# Patient Record
Sex: FEMALE | Race: ASIAN | Employment: FULL TIME | URBAN - METROPOLITAN AREA
[De-identification: names, ages, dates, MRNs, and addresses within clinical notes are randomized per-mention and may not be internally consistent; named-entity substitution may affect disease eponyms.]

---

## 2017-01-13 ENCOUNTER — MYC MEDICAL ADVICE (OUTPATIENT)
Dept: GASTROENTEROLOGY | Facility: CLINIC | Age: 42
End: 2017-01-13

## 2017-01-13 DIAGNOSIS — R10.31 ABDOMINAL PAIN, RIGHT LOWER QUADRANT: Primary | ICD-10-CM

## 2017-01-13 RX ORDER — AMITRIPTYLINE HYDROCHLORIDE 10 MG/1
20 TABLET ORAL AT BEDTIME
Qty: 60 TABLET | Refills: 2 | Status: SHIPPED | OUTPATIENT
Start: 2017-01-13 | End: 2017-02-15

## 2017-02-14 ASSESSMENT — ENCOUNTER SYMPTOMS
VOMITING: 0
HEARTBURN: 0
BLOOD IN STOOL: 0
JAUNDICE: 0
NAUSEA: 0
BLOATING: 1
DIARRHEA: 0
RECTAL PAIN: 0
BOWEL INCONTINENCE: 0
CONSTIPATION: 1
ABDOMINAL PAIN: 1
RECTAL BLEEDING: 0

## 2017-02-15 ENCOUNTER — OFFICE VISIT (OUTPATIENT)
Dept: GASTROENTEROLOGY | Facility: CLINIC | Age: 42
End: 2017-02-15

## 2017-02-15 VITALS
SYSTOLIC BLOOD PRESSURE: 117 MMHG | BODY MASS INDEX: 22.52 KG/M2 | TEMPERATURE: 98.9 F | HEIGHT: 61 IN | OXYGEN SATURATION: 98 % | DIASTOLIC BLOOD PRESSURE: 69 MMHG | HEART RATE: 74 BPM | WEIGHT: 119.3 LBS

## 2017-02-15 DIAGNOSIS — K58.9 IRRITABLE BOWEL SYNDROME WITHOUT DIARRHEA: Primary | ICD-10-CM

## 2017-02-15 DIAGNOSIS — Z91.011 MILK ALLERGY: ICD-10-CM

## 2017-02-15 DIAGNOSIS — R10.31 ABDOMINAL PAIN, RIGHT LOWER QUADRANT: ICD-10-CM

## 2017-02-15 RX ORDER — AMITRIPTYLINE HYDROCHLORIDE 10 MG/1
30 TABLET ORAL AT BEDTIME
Qty: 90 TABLET | Refills: 2 | Status: SHIPPED | OUTPATIENT
Start: 2017-02-15 | End: 2017-06-08

## 2017-02-15 RX ORDER — HYOSCYAMINE SULFATE 0.125 MG
0.125-0.25 TABLET ORAL EVERY 4 HOURS PRN
Qty: 60 TABLET | Refills: 1 | Status: SHIPPED | OUTPATIENT
Start: 2017-02-15 | End: 2017-11-30

## 2017-02-15 ASSESSMENT — PAIN SCALES - GENERAL: PAINLEVEL: NO PAIN (0)

## 2017-02-15 NOTE — MR AVS SNAPSHOT
After Visit Summary   2/15/2017    Bee Norris    MRN: [id number]           Patient Information     Date Of Birth          1975        Visit Information        Provider Department      2/15/2017 1:30 PM Ora Grove, APRN CNP [facility]        Today's Diagnoses     Irritable bowel syndrome without diarrhea    -  1    Milk allergy        Abdominal pain, right lower quadrant          Care Instructions    Soluble fiber sops up water and gives soft stool and formed stool.    This also supports healthy gut bacteria.  Psyllium (Metamucil) is most natural.   Powder - mix and drink immediately, -  or use the tablets.  or  Wheat dextrin (Benefiber) which has no taste or texture.   Available as powder, capsule, chewable.  When in doubt, return to powder.  Take the dose on the label.   If you have no improvement at two weeks, increase the dose and be sure to use it twice daily.  You may feel bloat at the beginning.   Improvement comes gradually.  Continue this regularly for a couple months before deciding whether it is helpful for you.  * Or synthetic Citrucel. Some think they have less bloat with this.      The Low FODMAP diet.  Some persons have difficulty digesting one or more of the carbohydrate groups.  This causes bloat. It affects more people than gluten intolerance.  The Low FODMAP diet is best explored with a dietitian for maintaining nutrition needs. The FODMAPs are fructo-oligo-di-monosaccharides or polyol carbohydrates.    For examples  https://[email or web address].com/[email or web address]/fodmap-intolerances.jpg  This is what we printed out.    Bloat that goes away in hours or overnight is usually fluid shifts with digestion, gas, abdominal cramps tensing the abdomen, or excess stool.  If it does not go away overnight, it is usually fat.  However, if bloat does not go away overnight and you are not SURE it is fat, go to your primary provider or GYN provider for a  check.    Bloat is sometimes a reaction to fiber. It is increased also with soda, sugar, ... You had no benefit from soda free...    You have not found a specific food intolerance.  Restart dairy with lactose free.  Later, ok to try dairy with lactose.   It is more likely that you have lactose intolerance than milk protein issues.     Lactose intolerance is the inability to digest the milk sugar.  Is it a common cause of gas and of loose stool or both.  This may come on slowly any time in life.  It happens more as we grow older.    Some people tolerate small amounts of lactose as in hard cheese or very small servings.  Alternatives are   Lactose free milk and milk products  Lactaid Milk   [Lactaid drop or pills]  Fountain milk  Soy milk and yogurt.      Good that you are able to travel without constipation .  If you need help,  Magnesium oxide tablets 400 mg 2, 4, 6, (*)  Or Milk of magnesia    Not mag cit, which is harsh.    Amitriptyline increase to 30 mg q bedtime. If no better in a few weeks, do you wish to taper off?  30 to 20 and stay there a couple weeks. 20 to 10 and stay there a couple weeks.  Then 10 mg every other day bedtime for another week or two.  If not ok, restart or go back to the dose before.    You have no strain \so no further testing is needed about that.    Your bloat still goes away again, so no gym worries about the bloat.    Retry hyoscyamine as you don't recall the long acting form from Feb 2016 Dr. Quintanilla. It was dc'd without constipation comment.    [facility]   For appointments call Victoria, [phone]  Coordinator  [phone]   Janice  GI Nurse Triage  [phone] for Medical Questions, # 3  Fax results to                         Follow-ups after your visit        Who to contact     Please call your clinic at [phone] to:    Ask questions about your health    Make or cancel appointments    Discuss your medicines    Learn about your test  "results    Speak to your doctor   If you have compliments or concerns about an experience at your clinic, or if you wish to file a complaint, please contact [facility] Patient Relations at [phone] or email us at [email or web address]         Additional Information About Your Visit        [employer] Information     [employer] gives you secure access to your electronic health record. If you see a primary care provider, you can also send messages to your care team and make appointments. If you have questions, please call your primary care clinic.  If you do not have a primary care provider, please call [phone] and they will assist you.      [employer] is an electronic gateway that provides easy, online access to your medical records. With [employer], you can request a clinic appointment, read your test results, renew a prescription or communicate with your care team.     To access your existing account, please contact your [facility] or call [phone] for assistance.        Care EveryWhere ID     This is your Care EveryWhere ID. This could be used by other organizations to access your [facility] medical records  [id number]        Your Vitals Were     Pulse Temperature Height Pulse Oximetry BMI (Body Mass Index)       74 98.9  F (37.2  C) 5' 1\" 98% 22.54 kg/m2        Blood Pressure from Last 3 Encounters:   02/15/17 117/69   09/19/16 114/75   09/15/16 103/61    Weight from Last 3 Encounters:   02/15/17 119 lb 4.8 oz   09/19/16 112 lb   09/15/16 112 lb 14.4 oz              Today, you had the following     No orders found for display         Today's Medication Changes          These changes are accurate as of: 2/15/17  2:28 PM.  If you have any questions, ask your nurse or doctor.               Start taking these medicines.        Dose/Directions    hyoscyamine 0.125 MG tablet   Commonly known as:  ANASPAZ/LEVSIN   Used for:  Irritable bowel syndrome " without diarrhea   Started by:  Ora Grove APRN CNP        Dose:  0.125-0.25 mg   Take 1-2 tablets (125-250 mcg) by mouth every 4 hours as needed for cramping   Quantity:  60 tablet   Refills:  1         These medicines have changed or have updated prescriptions.        Dose/Directions    amitriptyline 10 MG tablet   Commonly known as:  ELAVIL   This may have changed:  how much to take   Used for:  Abdominal pain, right lower quadrant   Changed by:  Ora Grove APRN CNP        Dose:  30 mg   Take 3 tablets (30 mg) by mouth At Bedtime   Quantity:  90 tablet   Refills:  2            Where to get your medicines      These medications were sent to [id number] - [place], [place] - [address] AT [facility] [address]  [address], [place] [address]     Phone:  [phone]     amitriptyline 10 MG tablet    hyoscyamine 0.125 MG tablet                Primary Care Provider Office Phone # Fax #    Shayy Linares [phone]       [facility] [address]  [place] [address]        Thank you!     Thank you for choosing [facility]  for your care. Our goal is always to provide you with excellent care. Hearing back from our patients is one way we can continue to improve our services. Please take a few minutes to complete the written survey that you may receive in the mail after your visit with us. Thank you!             Your Updated Medication List - Protect others around you: Learn how to safely use, store and throw away your medicines at www.disposemymeds.org.          This list is accurate as of: 2/15/17  2:28 PM.  Always use your most recent med list.                   Brand Name Dispense Instructions for use    amitriptyline 10 MG tablet    ELAVIL    90 tablet    Take 3 tablets (30 mg) by mouth At Bedtime       DAILY MULTIVITAMIN PO      Take  by mouth daily.       hyoscyamine 0.125 MG tablet    ANASPAZ/LEVSIN    60 tablet     Take 1-2 tablets (125-250 mcg) by mouth every 4 hours as needed for cramping       IBUPROFEN PO      Take 200 mg by mouth       MIRALAX PO          norgestimate-ethinyl estradiol 0.25-35 MG-MCG per tablet    ORTHO-CYCLEN, SPRINTEC    84 tablet    Take 1 tablet by mouth daily       TYLENOL PO      Take 320 mg by mouth       UNABLE TO FIND      MEDICATION NAME: Shannan

## 2017-02-15 NOTE — LETTER
2/15/2017       RE: Bee Norris  [address]  [place] [address]     Dear Colleague,    Thank you for referring your patient, Bee Norris, to the [facility] at [facility]. Please see a copy of my visit note below.    CHIEF COMPLAINT:  Irritable bowel syndrome, right lower quadrant pain.      HISTORY OF PRESENT ILLNESS:  Bee is a 41-year-old woman last seen 09/05/2016 at her third visit regarding the above complaints.  She had been very well studied with numerous tests and had no sign or suggestion of disease.  She had used peppermint, which caused acid reflux, and anna, which caused everything to taste like anna and did not give benefit.  She was started on amitriptyline 10 mg at a time as a trial.  Studies had included a colonoscopy, which was done with sedation, went okay, was normal.  She also describes low back pain and had a steroid injection with no benefit and after the last visit, went to a chiropractor several times over 3 weeks.      Bee corrects that her abdominal distress is lifelong and not simply since 02/2016 as one earlier note stated.  She had no change, good or bad, with her dairy-free trial.  She had no benefit for her back pain or for her right lower quadrant pain or abdominal symptoms with the several chiropractic treatments.  She maintains good exercise and senses little or no benefit with that.      Bee continues to have bloat and gas, which are often together though not always.  There is no time frame that is consistent for when either of these occur.  She has a bowel movement usually once daily, perhaps twice daily twice a week.  She had no benefit from the dicyclomine and had previously had hyoscyamine.  She continues to have a sense of incomplete evacuation and occasionally needs to use multiple wipes.  She is taking Psyllium 2 capsules at noon, 4 and 10 p.m. for a daily total of 6.  She is  no longer using any MiraLax.  She has watched and has not found any association with her dietary intake.  She believes her pain is less than before, though it continues.  She no longer ever has strain for her bowel movements.  She thinks the amitriptyline is of some benefit for this decrease in pain and decrease in thinking of the pain.      MEDICAL HISTORY, MEDICATIONS, ADVERSE DRUG REACTIONS:  Reviewed.  There are no new diagnoses.      SOCIAL HISTORY:  She is single and lives alone.  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  She uses no tobacco, alcohol or street drugs.      REVIEW OF SYSTEMS:  Overall well.  Seldom uses ibuprofen.  Denies symptoms in any other systems.      OBJECTIVE:   VITAL SIGNS:  Reviewed, normal and stable.  BMI 22.54.   GENERAL:  Clean, neatly groomed, slender but fit-appearing woman who shows no distress.  Breathing is calm.  Speech is fluent and logical.  Not otherwise examined today.      ASSESSMENT:  A 41-year-old woman with bloat and abdominal discomfort and suboptimal stool who feels slightly better with low dose amitriptyline and who occasionally has cramping abdomen after meals.      PLAN:   1.  Continue amitriptyline at this time.   2.  Again trial of hyoscyamine.   3.  Consider increasing her soluble fiber or try a different kind of soluble fiber.   4.  Look further at the low FODMAP diet.  Let us know if she wishes to meet with the dietitian about that.  She has looked at it briefly on her own and we have given her a different chart today.   5.  She is cautioned to be sure that bloat always goes away and otherwise to be seen by her primary provider or GYN.   6.  Note whether she has bloat increased after fiber.  She had no benefit from soda free but also drinks usually only 1 soda some days.  She remains lactose-free and should now restart dairy with lactose free trial and later try with lactose again.  It is not clear that she actually  has any milk protein reaction, though she had a very mild elevation on her blood allergy test for dairy.      Bee does report being able to travel without constipation if she is traveling alone for work, though with other persons, she still has difficulty.  She no longer has to strain for stool so pelvic floor studies are no longer being entertained.      We discussed the assessment and plan.  There were no further questions.  Bee will keep in touch and return in 1 year if she does not need a sooner return.      CORA MACIEL CNP       D: 2017 16:06   T: 2017 11:27   MT: BRADLY      Name:     BEE LOWRY   MRN:      [id number]        Account:      [id number]   :      1975           Service Date: 02/15/2017      Document: [id number]

## 2017-02-15 NOTE — PATIENT INSTRUCTIONS
Soluble fiber sops up water and gives soft stool and formed stool.    This also supports healthy gut bacteria.  Psyllium (Metamucil) is most natural.   Powder - mix and drink immediately, -  or use the tablets.  or  Wheat dextrin (Benefiber) which has no taste or texture.   Available as powder, capsule, chewable.  When in doubt, return to powder.  Take the dose on the label.   If you have no improvement at two weeks, increase the dose and be sure to use it twice daily.  You may feel bloat at the beginning.   Improvement comes gradually.  Continue this regularly for a couple months before deciding whether it is helpful for you.  * Or synthetic Citrucel. Some think they have less bloat with this.      The Low FODMAP diet.  Some persons have difficulty digesting one or more of the carbohydrate groups.  This causes bloat. It affects more people than gluten intolerance.  The Low FODMAP diet is best explored with a dietitian for maintaining nutrition needs. The FODMAPs are fructo-oligo-di-monosaccharides or polyol carbohydrates.    For examples  https://ibsfreedom.files.[employer]/[email or web address]/03/fodmap-intolerances.jpg  This is what we printed out.    Bloat that goes away in hours or overnight is usually fluid shifts with digestion, gas, abdominal cramps tensing the abdomen, or excess stool.  If it does not go away overnight, it is usually fat.  However, if bloat does not go away overnight and you are not SURE it is fat, go to your primary provider or GYN provider for a check.    Bloat is sometimes a reaction to fiber. It is increased also with soda, sugar, ... You had no benefit from soda free...    You have not found a specific food intolerance.  Restart dairy with lactose free.  Later, ok to try dairy with lactose.   It is more likely that you have lactose intolerance than milk protein issues.     Lactose intolerance is the inability to digest the milk sugar.  Is it a common cause of gas and of loose stool or both.  This  may come on slowly any time in life.  It happens more as we grow older.    Some people tolerate small amounts of lactose as in hard cheese or very small servings.  Alternatives are   Lactose free milk and milk products  Lactaid Milk   [Lactaid drop or pills]  [place] milk  Soy milk and yogurt.      Good that you are able to travel without constipation .  If you need help,  Magnesium oxide tablets 400 mg 2, 4, 6, (*)  Or Milk of magnesia    Not mag cit, which is harsh.    Amitriptyline increase to 30 mg q bedtime. If no better in a few weeks, do you wish to taper off?  30 to 20 and stay there a couple weeks. 20 to 10 and stay there a couple weeks.  Then 10 mg every other day bedtime for another week or two.  If not ok, restart or go back to the dose before.    You have no strain \so no further testing is needed about that.    Your bloat still goes away again, so no gym worries about the bloat.    Retry hyoscyamine as you don't recall the long acting form from Feb 2016 Dr. Quintanilla. It was dc'd without constipation comment.    [facility]   For appointments call Victoria, [phone]  Coordinator  [phone]   Janice  GI Nurse Triage  [phone] for Medical Questions, # 3  Fax results to

## 2017-02-15 NOTE — NURSING NOTE
"Chief Complaint   Patient presents with     RECHECK     f/u       Vitals:    02/15/17 1336   BP: 117/69   BP Location: Left arm   Patient Position: Chair   Pulse: 74   Temp: 98.9  F (37.2  C)   SpO2: 98%   Weight: 119 lb 4.8 oz   Height: 5' 1\"       Body mass index is 22.54 kg/(m^2).    Marta Carbajal MA                            "

## 2017-02-17 NOTE — PROGRESS NOTES
CHIEF COMPLAINT:  Irritable bowel syndrome, right lower quadrant pain.      HISTORY OF PRESENT ILLNESS:  Bee is a 41-year-old woman last seen 09/05/2016 at her third visit regarding the above complaints.  She had been very well studied with numerous tests and had no sign or suggestion of disease.  She had used peppermint, which caused acid reflux, and anna, which caused everything to taste like anna and did not give benefit.  She was started on amitriptyline 10 mg at a time as a trial.  Studies had included a colonoscopy, which was done with sedation, went okay, was normal.  She also describes low back pain and had a steroid injection with no benefit and after the last visit, went to a chiropractor several times over 3 weeks.      Bee corrects that her abdominal distress is lifelong and not simply since 02/2016 as one earlier note stated.  She had no change, good or bad, with her dairy-free trial.  She had no benefit for her back pain or for her right lower quadrant pain or abdominal symptoms with the several chiropractic treatments.  She maintains good exercise and senses little or no benefit with that.      Bee continues to have bloat and gas, which are often together though not always.  There is no time frame that is consistent for when either of these occur.  She has a bowel movement usually once daily, perhaps twice daily twice a week.  She had no benefit from the dicyclomine and had previously had hyoscyamine.  She continues to have a sense of incomplete evacuation and occasionally needs to use multiple wipes.  She is taking Psyllium 2 capsules at noon, 4 and 10 p.m. for a daily total of 6.  She is no longer using any MiraLax.  She has watched and has not found any association with her dietary intake.  She believes her pain is less than before, though it continues.  She no longer ever has strain for her bowel movements.  She thinks the amitriptyline is of some benefit for this decrease in  pain and decrease in thinking of the pain.      MEDICAL HISTORY, MEDICATIONS, ADVERSE DRUG REACTIONS:  Reviewed.  There are no new diagnoses.      SOCIAL HISTORY:  She is single and lives alone.  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  She uses no tobacco, alcohol or street drugs.      REVIEW OF SYSTEMS:  Overall well.  Seldom uses ibuprofen.  Denies symptoms in any other systems.      OBJECTIVE:   VITAL SIGNS:  Reviewed, normal and stable.  BMI 22.54.   GENERAL:  Clean, neatly groomed, slender but fit-appearing woman who shows no distress.  Breathing is calm.  Speech is fluent and logical.  Not otherwise examined today.      ASSESSMENT:  A 41-year-old woman with bloat and abdominal discomfort and suboptimal stool who feels slightly better with low dose amitriptyline and who occasionally has cramping abdomen after meals.      PLAN:   1.  Continue amitriptyline at this time.   2.  Again trial of hyoscyamine.   3.  Consider increasing her soluble fiber or try a different kind of soluble fiber.   4.  Look further at the low FODMAP diet.  Let us know if she wishes to meet with the dietitian about that.  She has looked at it briefly on her own and we have given her a different chart today.   5.  She is cautioned to be sure that bloat always goes away and otherwise to be seen by her primary provider or GYN.   6.  Note whether she has bloat increased after fiber.  She had no benefit from soda free but also drinks usually only 1 soda some days.  She remains lactose-free and should now restart dairy with lactose free trial and later try with lactose again.  It is not clear that she actually has any milk protein reaction, though she had a very mild elevation on her blood allergy test for dairy.      Bee does report being able to travel without constipation if she is traveling alone for work, though with other persons, she still has difficulty.  She no longer has to strain for  stool so pelvic floor studies are no longer being entertained.      We discussed the assessment and plan.  There were no further questions.  Bee will keep in touch and return in 1 year if she does not need a sooner return.         CORA MACIEL CNP             D: 2017 16:06   T: 2017 11:27   MT: BRADLY      Name:     BEE LOWRY   MRN:      [id number]        Account:      [id number]   :      1975           Service Date: 02/15/2017      Document: [id number]

## 2017-05-22 ENCOUNTER — OFFICE VISIT (OUTPATIENT)
Dept: FAMILY MEDICINE | Facility: CLINIC | Age: 42
End: 2017-05-22
Payer: COMMERCIAL

## 2017-05-22 VITALS
BODY MASS INDEX: 23.79 KG/M2 | OXYGEN SATURATION: 98 % | HEIGHT: 61 IN | DIASTOLIC BLOOD PRESSURE: 67 MMHG | RESPIRATION RATE: 16 BRPM | HEART RATE: 73 BPM | SYSTOLIC BLOOD PRESSURE: 112 MMHG | WEIGHT: 126 LBS | TEMPERATURE: 98.5 F

## 2017-05-22 DIAGNOSIS — D12.6 BENIGN NEOPLASM OF COLON, UNSPECIFIED PART OF COLON: ICD-10-CM

## 2017-05-22 DIAGNOSIS — Z11.3 SCREEN FOR STD (SEXUALLY TRANSMITTED DISEASE): ICD-10-CM

## 2017-05-22 DIAGNOSIS — R63.5 ABNORMAL WEIGHT GAIN: ICD-10-CM

## 2017-05-22 DIAGNOSIS — N83.201 CYST OF RIGHT OVARY: ICD-10-CM

## 2017-05-22 DIAGNOSIS — Z00.00 ROUTINE GENERAL MEDICAL EXAMINATION AT A HEALTH CARE FACILITY: Primary | ICD-10-CM

## 2017-05-22 DIAGNOSIS — M79.89 SWELLING OF LIMB: ICD-10-CM

## 2017-05-22 LAB
ALBUMIN UR-MCNC: NEGATIVE MG/DL
APPEARANCE UR: CLEAR
BACTERIA #/AREA URNS HPF: ABNORMAL /HPF
BETA HCG QUAL IFA URINE: NEGATIVE
BILIRUB UR QL STRIP: NEGATIVE
COLOR UR AUTO: YELLOW
GLUCOSE UR STRIP-MCNC: NEGATIVE MG/DL
HGB UR QL STRIP: ABNORMAL
KETONES UR STRIP-MCNC: NEGATIVE MG/DL
LEUKOCYTE ESTERASE UR QL STRIP: NEGATIVE
NITRATE UR QL: NEGATIVE
NON-SQ EPI CELLS #/AREA URNS LPF: ABNORMAL /LPF
PH UR STRIP: 6.5 PH (ref 5–7)
RBC #/AREA URNS AUTO: ABNORMAL /HPF (ref 0–2)
SP GR UR STRIP: 1.01 (ref 1–1.03)
URN SPEC COLLECT METH UR: ABNORMAL
UROBILINOGEN UR STRIP-ACNC: 0.2 EU/DL (ref 0.2–1)
WBC #/AREA URNS AUTO: ABNORMAL /HPF (ref 0–2)

## 2017-05-22 PROCEDURE — 87624 HPV HI-RISK TYP POOLED RSLT: CPT | Performed by: FAMILY MEDICINE

## 2017-05-22 PROCEDURE — 84703 CHORIONIC GONADOTROPIN ASSAY: CPT | Performed by: FAMILY MEDICINE

## 2017-05-22 PROCEDURE — 84443 ASSAY THYROID STIM HORMONE: CPT | Performed by: FAMILY MEDICINE

## 2017-05-22 PROCEDURE — 81001 URINALYSIS AUTO W/SCOPE: CPT | Performed by: FAMILY MEDICINE

## 2017-05-22 PROCEDURE — 87491 CHLMYD TRACH DNA AMP PROBE: CPT | Performed by: FAMILY MEDICINE

## 2017-05-22 PROCEDURE — 36415 COLL VENOUS BLD VENIPUNCTURE: CPT | Performed by: FAMILY MEDICINE

## 2017-05-22 PROCEDURE — 87591 N.GONORRHOEAE DNA AMP PROB: CPT | Performed by: FAMILY MEDICINE

## 2017-05-22 PROCEDURE — 84439 ASSAY OF FREE THYROXINE: CPT | Performed by: FAMILY MEDICINE

## 2017-05-22 PROCEDURE — G0145 SCR C/V CYTO,THINLAYER,RESCR: HCPCS | Performed by: FAMILY MEDICINE

## 2017-05-22 PROCEDURE — 99396 PREV VISIT EST AGE 40-64: CPT | Performed by: FAMILY MEDICINE

## 2017-05-22 RX ORDER — NORGESTIMATE AND ETHINYL ESTRADIOL 0.25-0.035
1 KIT ORAL DAILY
Qty: 84 TABLET | Refills: 3 | Status: SHIPPED | OUTPATIENT
Start: 2017-05-22 | End: 2018-05-10

## 2017-05-22 ASSESSMENT — ANXIETY QUESTIONNAIRES
GAD7 TOTAL SCORE: 1
3. WORRYING TOO MUCH ABOUT DIFFERENT THINGS: NOT AT ALL
2. NOT BEING ABLE TO STOP OR CONTROL WORRYING: SEVERAL DAYS
7. FEELING AFRAID AS IF SOMETHING AWFUL MIGHT HAPPEN: NOT AT ALL
6. BECOMING EASILY ANNOYED OR IRRITABLE: NOT AT ALL
5. BEING SO RESTLESS THAT IT IS HARD TO SIT STILL: NOT AT ALL
1. FEELING NERVOUS, ANXIOUS, OR ON EDGE: NOT AT ALL

## 2017-05-22 ASSESSMENT — PATIENT HEALTH QUESTIONNAIRE - PHQ9: 5. POOR APPETITE OR OVEREATING: NOT AT ALL

## 2017-05-22 NOTE — PROGRESS NOTES
SUBJECTIVE:     CC: Bee Norris is an 42 year old woman who presents for preventive health visit.     Physical   Annual:     Getting at least 3 servings of Calcium per day::  Yes    Bi-annual eye exam::  Yes    Dental care twice a year::  Yes    Sleep apnea or symptoms of sleep apnea::  None    Diet::  Vegetarian/vegan    Frequency of exercise::  4-5 days/week    Duration of exercise::  45-60 minutes    Taking medications regularly::  Yes    Medication side effects::  None    Additional concerns today::  YES (weight gain, right thigh pain, where the cyst was, dry hands , and breast tenderness.  )     CV: no known risk factors; her family hx is unknown.   Malignancy: pap due today.  Last was 2013, NIL and neg HPV, and prior to that, she had LSIL 11/8/2012.  Colposcopy 12/7/2012 showed focal squamous atypica not sufficient for the diagnosis of koilocytosis.  No breast pain or nipple discharge, no masses that she has noticed.  She has had lumps in her axillae that have been present and unchanging for at least a couple of years, and evaluated by Corrine Quintanilla in clinic.  She has noted some left breast tenderness just recently over the past week or so; she is overdue for her period.  She did have a colonoscopy this past year to evaluate her GI symptoms, and one polyp was removed (benign polypoid colorectal mucosa with associated lymphoid follicle); internal hemorrhoids were found.  Bone health: good exercise and calcium intake  Immunizations: up to date  Sexual health: has been monogamous with boyfriend, who has now moved away to [place] for work.  Last intercourse was in April; she did not get a period in April, but she notes that it is not uncommon for her to miss one or two periods per year.  She is compliant with her OCPs, which were changed by her OB/GYN to a higher dose to try and help with ovarian cysts. She also uses condoms.  However, she has had some intermittent right pelvic pain that is mild but  reminds her of pain from ovarian cysts for the past week or so.  Patient's last menstrual period was 03/15/2017 (approximate).    Depression screen: neg    Other: she continues to have IBS symptoms, tried metamucil and miralax, but had too much painful bloating, so now switched fiber to citrucel to see how that will do.     She has noted a gradual unintended weight gain over the past several months, and she feels bloated, with her fingers swollen (ring is tight).  She hasn't noticed any lower extremity edema.  She feels she continues to eat a healthy diet, and she has been exercising more, but just can't lose weight. She has noticed dry skin on her hands.  No hair or nail changes.  No palpitations.       Today's PHQ-2 Score:   PHQ-2 ( 1999 Pfizer) 5/22/2017   Little interest or pleasure in doing things Not at all   Feeling down, depressed or hopeless Not at all   PHQ-2 Score 0       Abuse: Current or Past(Physical, Sexual or Emotional)- No  Do you feel safe in your environment - Yes    Social History   Substance Use Topics     Smoking status: Never Smoker     Smokeless tobacco: Never Used     Alcohol use No      Comment: rarely     The patient does not drink >3 drinks per day nor >7 drinks per week.    No results for input(s): CHOL, HDL, LDL, TRIG, CHOLHDLRATIO, NHDL in the last 67946 hours.    Reviewed orders with patient.  Reviewed health maintenance and updated orders accordingly - Yes    Mammo Decision Support:  Patient under age 50, mutual decision reflected in health maintenance.      Pertinent mammograms are reviewed under the imaging tab.  History of abnormal Pap smear:     Reviewed and updated as needed this visit by clinical staff         Reviewed and updated as needed this visit by Provider            ROS:  C: NEGATIVE for fever, chills, POSITIVE FOR WEIGHT GAIN  I: NEGATIVE for worrisome rashes, moles or lesions; POSITIVE FOR DRY SKIN  E: NEGATIVE for vision changes or irritation  ENT: NEGATIVE for ear,  mouth and throat problems  R: NEGATIVE for significant cough or SOB  B: NEGATIVE for masses, tenderness or discharge  CV: NEGATIVE for chest pain, palpitations or peripheral edema, except swollen fingers  GI: NEGATIVE for nausea, vomiting or heartburn;; positive for abdominal pain and constipation; no bloody stools or melena.   : NEGATIVE for unusual urinary or vaginal symptoms. Periods are occasionally irregular.  M: NEGATIVE for significant arthralgias or myalgia  N: NEGATIVE for weakness, dizziness or paresthesias  P: NEGATIVE for changes in mood or affect    Patient Active Problem List   Diagnosis     Contraceptive management     Papanicolaou smear of cervix with low grade squamous intraepithelial lesion (LGSIL)     CARDIOVASCULAR SCREENING; LDL GOAL LESS THAN 160     Irritable bowel syndrome without diarrhea     Milk allergy     Chronic right-sided low back pain with right-sided sciatica     Past Surgical History:   Procedure Laterality Date     BIOPSY  2016    Colonoscapy     C APPENDECTOMY  1983     COLONOSCOPY N/A 8/9/2016    Procedure: COMBINED COLONOSCOPY, SINGLE OR MULTIPLE BIOPSY/POLYPECTOMY BY BIOPSY;  Surgeon: Bee Paul MD;  Location: [facility] OR     COLONOSCOPY WITH CO2 INSUFFLATION N/A 8/9/2016    Procedure: COLONOSCOPY WITH CO2 INSUFFLATION;  Surgeon: Bee Paul MD;  Location: [facility] OR     HC TOOTH EXTRACTION W/FORCEP         Social History   Substance Use Topics     Smoking status: Never Smoker     Smokeless tobacco: Never Used     Alcohol use No      Comment: rarely     Family History   Problem Relation Age of Onset     Adopted: Yes     Unknown/Adopted Other      Unknown/Adopted Mother      Unknown/Adopted Father      Unknown/Adopted Maternal Grandmother      Unknown/Adopted Maternal Grandfather      Unknown/Adopted Paternal Grandmother      Unknown/Adopted Paternal Grandfather      Unknown/Adopted Brother      Unknown/Adopted Sister      Unknown/Adopted Son       "Unknown/Adopted Daughter      Unknown/Adopted Other          Current Outpatient Prescriptions   Medication Sig Dispense Refill     Psyllium (METAMUCIL FIBER PO)        Methylcellulose, Laxative, (CITRUCEL PO)        norgestimate-ethinyl estradiol (ORTHO-CYCLEN, SPRINTEC) 0.25-35 MG-MCG per tablet Take 1 tablet by mouth daily 84 tablet 3     hyoscyamine (ANASPAZ/LEVSIN) 0.125 MG tablet Take 1-2 tablets (125-250 mcg) by mouth every 4 hours as needed for cramping 60 tablet 1     amitriptyline (ELAVIL) 10 MG tablet Take 3 tablets (30 mg) by mouth At Bedtime (Patient taking differently: Take 30 mg by mouth At Bedtime Pt takes 2 tablet a day) 90 tablet 2     Polyethylene Glycol 3350 (MIRALAX PO)        UNABLE TO FIND MEDICATION NAME: [place]       IBUPROFEN PO Take 200 mg by mouth       Acetaminophen (TYLENOL PO) Take 320 mg by mouth       Multiple Vitamin (DAILY MULTIVITAMIN PO) Take  by mouth daily.       [DISCONTINUED] norgestimate-ethinyl estradiol (ORTHO-CYCLEN, SPRINTEC) 0.25-35 MG-MCG per tablet Take 1 tablet by mouth daily 84 tablet 3     Allergies   Allergen Reactions     Milk [Lac Bovis]      Sulfa Drugs      unsure of reaction, from early childhood     OBJECTIVE:     /67 (BP Location: Right arm, Patient Position: Chair, Cuff Size: Adult Regular)  Pulse 73  Temp 98.5  F (36.9  C) (Oral)  Resp 16  Ht 5' 1\" (1.549 m)  Wt 126 lb (57.2 kg)  LMP 03/15/2017 (Approximate)  SpO2 98%  BMI 23.81 kg/m2  EXAM:  GENERAL: healthy, alert and no distress  EYES: Eyes grossly normal to inspection, PERRL and conjunctivae and sclerae normal  HENT: ear canals and TM's normal, nose and mouth without ulcers or lesions  NECK: no adenopathy, no asymmetry, masses, or scars and thyroid normal to palpation  RESP: lungs clear to auscultation - no rales, rhonchi or wheezes  BREAST: normal without masses, tenderness or nipple discharge.  There are two small, firm, mobile, very superficial nodules in the left axilla, favor " epidermal cyst, but could be lymph node (pt states these are unchanged)  CV: regular rate and rhythm, normal S1 S2, no S3 or S4, no murmur, click or rub, no peripheral edema and peripheral pulses strong  ABDOMEN: soft, nontender, no hepatosplenomegaly, no masses and bowel sounds normal   (female): normal female external genitalia, normal urethral meatus, vaginal mucosa pink, moist, well rugated, and normal cervix/adnexa/uterus without masses or discharge; I do not palpate any mass on the right and she is not more tender on the right.    MS: no gross musculoskeletal defects noted, no edema  SKIN: no suspicious lesions or rashes  NEURO: Normal strength and tone, mentation intact and speech normal  PSYCH: mentation appears normal, affect normal/bright    ASSESSMENT/PLAN:     (Z00.00) Routine general medical examination at a health care facility  (primary encounter diagnosis)    CV: patient states she gets fasting labs done at work; she is not fasting today, so will not repeat  Malignancy: discussed if left breast pain does not resolve in the next couple of weeks, to f/u.  It may be due to menstrual cycle; she does have those two small nodules in the left axilla, but she says these have been there for years and are not changing.  Bone health: no risk factors  Immunizations: up to date  Sexual health: GC/CT screen  Depression screen: Neg  Comment: Plan: Pap imaged thin layer screen with HPV -done by [facility] and overseen by me.         recommended age 30 - 65, HPV High Risk Types         DNA Cervical, Urine Microscopic            (N83.201) Cyst of right ovary  Comment: discussed that repeat US would be reasonable if pelvic pain persists/worsens.  Last US was 9/16, showing a simple cyst within a cyst, that was smaller than on last ultrasound.    Plan: norgestimate-ethinyl estradiol (ORTHO-CYCLEN,         SPRINTEC) 0.25-35 MG-MCG per tablet            (M79.89) Swelling of limb  Comment: swollen fingers, no lower extremity  "edema; she feels she has gained a lot of \"water weight.\"  I think this might correspond to the time when she started her new OCP, but that could be coincidence.  I will check on basic labs to f/u proteinuria, thyroid disease and pregnancy.   Plan: TSH, T4, free, *UA reflex to Microscopic and         Culture ([place] and [facility] (except         [place] and [place]), Beta HCG qual IFA         urine            (R63.5) Abnormal weight gain  Comment: as above  Plan: Beta HCG qual IFA urine            (Z11.3) Screen for STD (sexually transmitted disease)  Comment:   Plan: NEISSERIA GONORRHOEA PCR, CHLAMYDIA TRACHOMATIS        PCR              COUNSELING:           reports that she has never smoked. She has never used smokeless tobacco.    Estimated body mass index is 22.54 kg/(m^2) as calculated from the following:    Height as of 2/15/17: 5' 1\" (1.549 m).    Weight as of 2/15/17: 119 lb 4.8 oz (54.1 kg).       Counseling Resources:  ATP IV Guidelines  Pooled Cohorts Equation Calculator  Breast Cancer Risk Calculator  FRAX Risk Assessment  ICSI Preventive Guidelines  Dietary Guidelines for Americans, 2010  Mevvy's MyPlate  ASA Prophylaxis  Lung CA Screening    Shayy Linares MD  [facility]  Answers for HPI/ROS submitted by the patient on 5/22/2017   Q1: Little interest or pleasure in doing things: 0=Not at all  Q2: Feeling down, depressed or hopeless: 0=Not at all  PHQ-2 Score: 0    "

## 2017-05-22 NOTE — NURSING NOTE
"Chief Complaint   Patient presents with     Physical       Initial /67 (BP Location: Right arm, Patient Position: Chair, Cuff Size: Adult Regular)  Pulse 73  Temp 98.5  F (36.9  C) (Oral)  Resp 16  Ht 5' 1\" (1.549 m)  Wt 126 lb (57.2 kg)  LMP 03/15/2017 (Approximate)  SpO2 98%  BMI 23.81 kg/m2 Estimated body mass index is 23.81 kg/(m^2) as calculated from the following:    Height as of this encounter: 5' 1\" (1.549 m).    Weight as of this encounter: 126 lb (57.2 kg).  Medication Reconciliation: unable or not appropriate to perform         Rah Clark MA      "

## 2017-05-22 NOTE — MR AVS SNAPSHOT
After Visit Summary   5/22/2017    Bee Norris    MRN: [id number]           Patient Information     Date Of Birth          1975        Visit Information        Provider Department      5/22/2017 1:00 PM Shayy Linares MD [facility]        Today's Diagnoses     Routine general medical examination at a health care facility    -  1    Cyst of right ovary        Swelling of limb        Abnormal weight gain        Screen for STD (sexually transmitted disease)        Benign neoplasm of colon, unspecified part of colon          Care Instructions      Preventive Health Recommendations  Female Ages 40 to 49    Yearly exam:     See your health care provider every year in order to  1. Review health changes.   2. Discuss preventive care.    3. Review your medicines if your doctor prescribed any.      Get a Pap test every three years (unless you have an abnormal result and your provider advises testing more often).      If you get Pap tests with HPV test, you only need to test every 5 years, unless you have an abnormal result. You do not need a Pap test if your uterus was removed (hysterectomy) and you have not had cancer.      You should be tested each year for STDs (sexually transmitted diseases), if you're at risk.       Ask your doctor if you should have a mammogram.      Have a colonoscopy (test for colon cancer) if someone in your family has had colon cancer or polyps before age 50.       Have a cholesterol test every 5 years.       Have a diabetes test (fasting glucose) after age 45. If you are at risk for diabetes, you should have this test every 3 years.    Shots: Get a flu shot each year. Get a tetanus shot every 10 years.     Nutrition:     Eat at least 5 servings of fruits and vegetables each day.    Eat whole-grain bread, whole-wheat pasta and brown rice instead of white grains and rice.    Talk to your provider about Calcium and Vitamin D.     Lifestyle    Exercise at  "least 150 minutes a week (an average of 30 minutes a day, 5 days a week). This will help you control your weight and prevent disease.    Limit alcohol to one drink per day.    No smoking.     Wear sunscreen to prevent skin cancer.    See your dentist every six months for an exam and cleaning.        Follow-ups after your visit        Who to contact     If you have questions or need follow up information about today's clinic visit or your schedule please contact [facility] directly at [phone].  Normal or non-critical lab and imaging results will be communicated to you by [employer], letter or phone within 4 business days after the clinic has received the results. If you do not hear from us within 7 days, please contact the clinic through [employer] or phone. If you have a critical or abnormal lab result, we will notify you by phone as soon as possible.  Submit refill requests through [employer] or call your pharmacy and they will forward the refill request to us. Please allow 3 business days for your refill to be completed.          Additional Information About Your Visit        [employer] Information     [employer] gives you secure access to your electronic health record. If you see a primary care provider, you can also send messages to your care team and make appointments. If you have questions, please call your primary care clinic.  If you do not have a primary care provider, please call [phone] and they will assist you.        Care EveryWhere ID     This is your Care EveryWhere ID. This could be used by other organizations to access your [facility] medical records  [id number]        Your Vitals Were     Pulse Temperature Respirations Height Last Period Pulse Oximetry    73 98.5  F (36.9  C) (Oral) 16 5' 1\" (1.549 m) 03/15/2017 (Approximate) 98%    BMI (Body Mass Index)                   23.81 kg/m2            Blood Pressure from Last 3 Encounters:   05/22/17 112/67   02/15/17 117/69   09/19/16 " 114/75    Weight from Last 3 Encounters:   05/22/17 126 lb (57.2 kg)   02/15/17 119 lb 4.8 oz (54.1 kg)   09/19/16 112 lb (50.8 kg)              We Performed the Following     *UA reflex to Microscopic and Culture ([place] and [facility] (except [place] and [place])     Beta HCG qual IFA urine     CHLAMYDIA TRACHOMATIS PCR     HPV High Risk Types DNA Cervical     NEISSERIA GONORRHOEA PCR     Pap imaged thin layer screen with HPV - recommended age 30 - 65     T4, free     TSH     Urine Microscopic          Today's Medication Changes          These changes are accurate as of: 5/22/17  5:44 PM.  If you have any questions, ask your nurse or doctor.               These medicines have changed or have updated prescriptions.        Dose/Directions    amitriptyline 10 MG tablet   Commonly known as:  ELAVIL   This may have changed:  additional instructions   Used for:  Abdominal pain, right lower quadrant        Dose:  30 mg   Take 3 tablets (30 mg) by mouth At Bedtime   Quantity:  90 tablet   Refills:  2            Where to get your medicines      These medications were sent to [facility] - [address] [address]     Phone:  [phone]     norgestimate-ethinyl estradiol 0.25-35 MG-MCG per tablet                Primary Care Provider Office Phone # Fax #    Shayy Linares [phone]       [facility] [address]  [place] [address]        Thank you!     Thank you for choosing [facility]  for your care. Our goal is always to provide you with excellent care. Hearing back from our patients is one way we can continue to improve our services. Please take a few minutes to complete the written survey that you may receive in the mail after your visit with us. Thank you!             Your Updated Medication List - Protect others around you: Learn how to safely use, store and throw away  your medicines at www.disposemymeds.org.          This list is accurate as of: 5/22/17  5:44 PM.  Always use your most recent med list.                   Brand Name Dispense Instructions for use    amitriptyline 10 MG tablet    ELAVIL    90 tablet    Take 3 tablets (30 mg) by mouth At Bedtime       CITRUCEL PO          DAILY MULTIVITAMIN PO      Take  by mouth daily.       hyoscyamine 0.125 MG tablet    ANASPAZ/LEVSIN    60 tablet    Take 1-2 tablets (125-250 mcg) by mouth every 4 hours as needed for cramping       IBUPROFEN PO      Take 200 mg by mouth       METAMUCIL FIBER PO          MIRALAX PO          norgestimate-ethinyl estradiol 0.25-35 MG-MCG per tablet    ORTHO-CYCLEN, SPRINTEC    84 tablet    Take 1 tablet by mouth daily       TYLENOL PO      Take 320 mg by mouth       UNABLE TO FIND      MEDICATION NAME: Shannan

## 2017-05-23 LAB
C TRACH DNA SPEC QL NAA+PROBE: NORMAL
N GONORRHOEA DNA SPEC QL NAA+PROBE: NORMAL
SPECIMEN SOURCE: NORMAL
SPECIMEN SOURCE: NORMAL
T4 FREE SERPL-MCNC: 0.91 NG/DL (ref 0.76–1.46)
TSH SERPL DL<=0.05 MIU/L-ACNC: 1.72 MU/L (ref 0.4–4)

## 2017-05-23 ASSESSMENT — ANXIETY QUESTIONNAIRES: GAD7 TOTAL SCORE: 1

## 2017-05-23 ASSESSMENT — PATIENT HEALTH QUESTIONNAIRE - PHQ9: SUM OF ALL RESPONSES TO PHQ QUESTIONS 1-9: 4

## 2017-05-24 LAB
COPATH REPORT: NORMAL
PAP: NORMAL

## 2017-05-25 LAB
FINAL DIAGNOSIS: NORMAL
HPV HR 12 DNA CVX QL NAA+PROBE: NEGATIVE
HPV16 DNA SPEC QL NAA+PROBE: NEGATIVE
HPV18 DNA SPEC QL NAA+PROBE: NEGATIVE
SPECIMEN DESCRIPTION: NORMAL

## 2017-06-08 DIAGNOSIS — R10.31 ABDOMINAL PAIN, RIGHT LOWER QUADRANT: ICD-10-CM

## 2017-06-08 RX ORDER — AMITRIPTYLINE HYDROCHLORIDE 10 MG/1
30 TABLET ORAL AT BEDTIME
Qty: 90 TABLET | Refills: 7 | Status: SHIPPED | OUTPATIENT
Start: 2017-06-08 | End: 2017-06-28

## 2017-06-08 NOTE — TELEPHONE ENCOUNTER
amitriptyline (ELAVIL) 10 MG tablet  Last Written Prescription Date:  2/15/17  Last Fill Quantity: 90,   # refills: 2  Last Office Visit with [facility], [facility] or [facility] prescribing provider: 2/15/17  Future Office visit:   none

## 2017-06-28 DIAGNOSIS — R10.31 ABDOMINAL PAIN, RIGHT LOWER QUADRANT: ICD-10-CM

## 2017-06-28 RX ORDER — AMITRIPTYLINE HYDROCHLORIDE 10 MG/1
30 TABLET ORAL AT BEDTIME
Qty: 270 TABLET | Refills: 2 | Status: SHIPPED | OUTPATIENT
Start: 2017-06-28 | End: 2019-02-14

## 2017-11-30 ENCOUNTER — OFFICE VISIT (OUTPATIENT)
Dept: FAMILY MEDICINE | Facility: CLINIC | Age: 42
End: 2017-11-30
Payer: COMMERCIAL

## 2017-11-30 VITALS
RESPIRATION RATE: 18 BRPM | OXYGEN SATURATION: 98 % | HEART RATE: 88 BPM | DIASTOLIC BLOOD PRESSURE: 72 MMHG | TEMPERATURE: 98.8 F | BODY MASS INDEX: 23.29 KG/M2 | WEIGHT: 123.25 LBS | SYSTOLIC BLOOD PRESSURE: 108 MMHG

## 2017-11-30 DIAGNOSIS — D23.5 BENIGN NEOPLASM OF SKIN OF TRUNK, EXCEPT SCROTUM: ICD-10-CM

## 2017-11-30 DIAGNOSIS — H61.23 BILATERAL IMPACTED CERUMEN: Primary | ICD-10-CM

## 2017-11-30 PROCEDURE — 99213 OFFICE O/P EST LOW 20 MIN: CPT | Mod: 25 | Performed by: NURSE PRACTITIONER

## 2017-11-30 PROCEDURE — 69209 REMOVE IMPACTED EAR WAX UNI: CPT | Mod: 50 | Performed by: NURSE PRACTITIONER

## 2017-11-30 NOTE — NURSING NOTE
Bee Norris is a 42 year old female who presents in clinic with complaint of impacted ear wax (cerumen).  Per the order of Corrine Quintanilla, ear wax was removed from both sides by flushing with warm water and stool softener solution and manual debridement has been performed. Patient denies pain/dizziness/discharge/drainage  (if yes, stop procedure and huddle with provider).  Ear wax has been successfully removed. (If not, huddle with provider).     Janet Manriquez RN has preformed visual inspection of ears after ear wash.     Patricia Gamino

## 2017-11-30 NOTE — PROGRESS NOTES
SUBJECTIVE:   Bee Norris is a 42 year old female who presents to clinic today for the following health issues:      Ear wax  Duration:  Feels like water is in ear for the past 6 weeks.  No ear pain.  No recent URI.  No history of allergies.     Cyst      Duration: last year and went away and came back during the last 6 months     Description (location/character/radiation): cyst and pimple on back.   For a while was painful due to location near bra strap.    It has been getting larger and she would like it removed.          Problem list and histories reviewed & adjusted, as indicated.  Additional history: as documented        Reviewed and updated as needed this visit by clinical staffTobacco  Allergies  Meds  Med Hx  Surg Hx  Fam Hx  Soc Hx      Reviewed and updated as needed this visit by Provider         ROS:  C: NEGATIVE for fever, chills, change in weight  INTEGUMENTARY/SKIN: see HPI  ENT/MOUTH: see HPI  R: NEGATIVE for significant cough or SOB  CV: NEGATIVE for chest pain, palpitations or peripheral edema    OBJECTIVE:     /72  Pulse 88  Temp 98.8  F (37.1  C) (Oral)  Resp 18  Wt 123 lb 4 oz (55.9 kg)  SpO2 98%  BMI 23.29 kg/m2  Body mass index is 23.29 kg/(m^2).  GENERAL: healthy, alert and no distress  HENT: normal cephalic/atraumatic, right ear: occluded with wax, left ear: some cerumen in canal, TM is normal, oropharynx clear and oral mucous membranes moist  NECK: no adenopathy, no asymmetry, masses, or scars and thyroid normal to palpation  RESP: lungs clear to auscultation - no rales, rhonchi or wheezes  CV: regular rate and rhythm, normal S1 S2, no S3 or S4, no murmur, click or rub, no peripheral edema and peripheral pulses strong  SKIN: approx 1 cm in diameter cystic nodule on mid-back        ASSESSMENT/PLAN:             1. Bilateral impacted cerumen  Ear wash done by MA, no instruments.   - HC REMOVAL IMPACTED CERUMEN IRRIGATION/LVG UNILAT    2. Benign neoplasm of skin of trunk,  except scrotum  Cyst vs dermatofibroma.  See Dr. Smallwood or Dr. Linares for removal.           Corrine Quintanilla NP  [facility]

## 2017-11-30 NOTE — MR AVS SNAPSHOT
After Visit Summary   11/30/2017    Bee Norris    MRN: [id number]           Patient Information     Date Of Birth          1975        Visit Information        Provider Department      11/30/2017 10:00 AM Corrine Quintanilla NP [facility]        Today's Diagnoses     Bilateral impacted cerumen    -  1    Benign neoplasm of skin of trunk, except scrotum           Follow-ups after your visit        Your next 10 appointments already scheduled     Dec 06, 2017 11:00 AM CST   Office Visit with Shayy Linares MD   [facility] ([facility])    [address] [place] [id number] [phone]           Bring a current list of meds and any records pertaining to this visit. For Physicals, please bring immunization records and any forms needing to be filled out. Please arrive 10 minutes early to complete paperwork.              Who to contact     If you have questions or need follow up information about today's clinic visit or your schedule please contact [facility] directly at [phone].  Normal or non-critical lab and imaging results will be communicated to you by Dealer.comhart, letter or phone within 4 business days after the clinic has received the results. If you do not hear from us within 7 days, please contact the clinic through V[employer] Vaccinest or phone. If you have a critical or abnormal lab result, we will notify you by phone as soon as possible.  Submit refill requests through Zentyal or call your pharmacy and they will forward the refill request to us. Please allow 3 business days for your refill to be completed.          Additional Information About Your Visit        MyChart Information     Zentyal gives you secure access to your electronic health record. If you see a primary care provider, you can also send messages to your care team and make appointments. If you have questions, please call your primary care  clinic.  If you do not have a primary care provider, please call [phone] and they will assist you.        Care EveryWhere ID     This is your Care EveryWhere ID. This could be used by other organizations to access your [place] medical records  [id number]        Your Vitals Were     Pulse Temperature Respirations Pulse Oximetry BMI (Body Mass Index)       88 98.8  F (37.1  C) (Oral) 18 98% 23.29 kg/m2        Blood Pressure from Last 3 Encounters:   11/30/17 108/72   05/22/17 112/67   02/15/17 117/69    Weight from Last 3 Encounters:   11/30/17 123 lb 4 oz (55.9 kg)   05/22/17 126 lb (57.2 kg)   02/15/17 119 lb 4.8 oz (54.1 kg)              We Performed the Following     HC REMOVAL IMPACTED CERUMEN IRRIGATION/LVG UNILAT        Primary Care Provider Office Phone # Fax #    Shayy Linares [phone]       [address] [place] [address]        Equal Access to Services     [facility]: Hadii aad ku hadasho Soomaali, waaxda luqadaha, qaybta kaalmada adeegyada, waxay rob langston . So [facility] [phone].    ATENCIÓN: Si habla español, tiene a delatorre disposición servicios gratuitos de asistencia lingüística. Llame al [phone].    We comply with applicable federal civil rights laws and [place] laws. We do not discriminate on the basis of race, color, national origin, age, disability, sex, sexual orientation, or gender identity.            Thank you!     Thank you for choosing [facility]  for your care. Our goal is always to provide you with excellent care. Hearing back from our patients is one way we can continue to improve our services. Please take a few minutes to complete the written survey that you may receive in the mail after your visit with us. Thank you!             Your Updated Medication List - Protect others around you: Learn how to safely use, store and throw away your medicines at www.disposemymeds.org.          This list is  accurate as of: 11/30/17 12:31 PM.  Always use your most recent med list.                   Brand Name Dispense Instructions for use Diagnosis    amitriptyline 10 MG tablet    ELAVIL    270 tablet    Take 3 tablets (30 mg) by mouth At Bedtime    Abdominal pain, right lower quadrant       CITRUCEL PO           DAILY MULTIVITAMIN PO      Take  by mouth daily.        IBUPROFEN PO      Take 200 mg by mouth        METAMUCIL FIBER PO           MIRALAX PO           norgestimate-ethinyl estradiol 0.25-35 MG-MCG per tablet    ORTHO-CYCLEN, SPRINTEC    84 tablet    Take 1 tablet by mouth daily    Cyst of right ovary       TYLENOL PO      Take 320 mg by mouth as needed        UNABLE TO FIND      MEDICATION NAME: Shannan

## 2017-12-06 ENCOUNTER — OFFICE VISIT (OUTPATIENT)
Dept: FAMILY MEDICINE | Facility: CLINIC | Age: 42
End: 2017-12-06
Payer: COMMERCIAL

## 2017-12-06 VITALS
OXYGEN SATURATION: 97 % | DIASTOLIC BLOOD PRESSURE: 65 MMHG | HEART RATE: 61 BPM | TEMPERATURE: 98.3 F | SYSTOLIC BLOOD PRESSURE: 96 MMHG | RESPIRATION RATE: 18 BRPM | BODY MASS INDEX: 23.62 KG/M2 | WEIGHT: 125 LBS

## 2017-12-06 DIAGNOSIS — D23.5 BENIGN NEOPLASM OF SKIN OF TRUNK, EXCEPT SCROTUM: Primary | ICD-10-CM

## 2017-12-06 PROCEDURE — 88304 TISSUE EXAM BY PATHOLOGIST: CPT | Performed by: FAMILY MEDICINE

## 2017-12-06 PROCEDURE — 11401 EXC TR-EXT B9+MARG 0.6-1 CM: CPT | Performed by: FAMILY MEDICINE

## 2017-12-06 PROCEDURE — 99207 ZZC DROP WITH A PROCEDURE: CPT | Mod: 25 | Performed by: FAMILY MEDICINE

## 2017-12-06 ASSESSMENT — ANXIETY QUESTIONNAIRES
7. FEELING AFRAID AS IF SOMETHING AWFUL MIGHT HAPPEN: NOT AT ALL
3. WORRYING TOO MUCH ABOUT DIFFERENT THINGS: NOT AT ALL
2. NOT BEING ABLE TO STOP OR CONTROL WORRYING: NOT AT ALL
6. BECOMING EASILY ANNOYED OR IRRITABLE: NOT AT ALL
5. BEING SO RESTLESS THAT IT IS HARD TO SIT STILL: NOT AT ALL
GAD7 TOTAL SCORE: 0
1. FEELING NERVOUS, ANXIOUS, OR ON EDGE: NOT AT ALL

## 2017-12-06 ASSESSMENT — PATIENT HEALTH QUESTIONNAIRE - PHQ9
SUM OF ALL RESPONSES TO PHQ QUESTIONS 1-9: 1
5. POOR APPETITE OR OVEREATING: NOT AT ALL

## 2017-12-06 ASSESSMENT — ENCOUNTER SYMPTOMS
CHILLS: 0
FEVER: 0

## 2017-12-06 NOTE — MR AVS SNAPSHOT
After Visit Summary   12/6/2017    Bee Norris    MRN: [id number]           Patient Information     Date Of Birth          1975        Visit Information        Provider Department      12/6/2017 11:00 AM Shayy Linares MD [facility]        Care Instructions      Biopsy - How to take care of the site?     The area should be covered with the original band-Aid and kept dry for the first 24 hours after the procedure. After the first 24 hours, you can gently clean the area with soap and water. Rinse well and pat dry. When showering, do not let the full force of the water come in contact with the biopsy site. This area should be covered with Vaseline and a band-Aid until the stitch is removed (If a suture is applied). This band-Aid should be replaced with fresh Vaseline applied daily (in either case-suture or no suture).    Keep the biopsy area dry for 24 hours and covered with a band-aid.    Apply the vaseline twice a day with a cotton swab or a clean finger, and keep the site covered with a band-aid.     If you are unable to cover the site with a band-aid, re-apply ointment 2-3 times a day.    The best time to do wound care is after a shower or bath. You may shower or bathe the day after the biopsy and you can get the site wet. However, keep the force of the water off the biopsy site. Do not soak the area in water, and change the band-aid if it gets wet.     A small scab will form and fall off by itself when the area is completely healed. The area will be red, and will become pink in color as it heals.     If any discomfort occurs after the local anesthetic wears off, acetaminophen (i.e. Tylenol) may be given.    If a small amount of bleeding is noticed, place a clean cloth over the area and apply constant firm pressure for ten minutes-- no peeking! Should the bleeding become heavier or not stop, call the clinic.    Call us if:    You have signs of an infection:    Thick,  yellow or pus-like wound drainage (clear, or slightly yellow drainage is ok)    Fevers greater than 100 degrees Fahrenheit    Spreading redness or warmth at the biopsy site               Follow-ups after your visit        Who to contact     If you have questions or need follow up information about today's clinic visit or your schedule please contact [facility] directly at [phone].  Normal or non-critical lab and imaging results will be communicated to you by BestTravelWebsiteshart, letter or phone within 4 business days after the clinic has received the results. If you do not hear from us within 7 days, please contact the clinic through BestTravelWebsiteshart or phone. If you have a critical or abnormal lab result, we will notify you by phone as soon as possible.  Submit refill requests through [employer] or call your pharmacy and they will forward the refill request to us. Please allow 3 business days for your refill to be completed.          Additional Information About Your Visit        BestTravelWebsiteshart Information     [employer] gives you secure access to your electronic health record. If you see a primary care provider, you can also send messages to your care team and make appointments. If you have questions, please call your primary care clinic.  If you do not have a primary care provider, please call [phone] and they will assist you.        Care EveryWhere ID     This is your Care EveryWhere ID. This could be used by other organizations to access your [place] medical records  [id number]        Your Vitals Were     Pulse Temperature Respirations Last Period Pulse Oximetry BMI (Body Mass Index)    61 98.3  F (36.8  C) (Tympanic) 18 11/15/2017 (Approximate) 97% 23.62 kg/m2       Blood Pressure from Last 3 Encounters:   12/06/17 96/65   11/30/17 108/72   05/22/17 112/67    Weight from Last 3 Encounters:   12/06/17 125 lb (56.7 kg)   11/30/17 123 lb 4 oz (55.9 kg)   05/22/17 126 lb (57.2 kg)              Today, you had the  following     No orders found for display       Primary Care Provider Office Phone # Fax #    Shayy Linares [phone] [address]  [place] [address]        Equal Access to Services     [facility] : Hadii ayesha pepper leonelo Sojamesali, waaxda luqadaha, qaybta kaalmada adeegyada, ursula shantanuin hayaan carissamoises lima kian martin. So [facility] [phone].    ATENCIÓN: Si habla español, tiene a edlatorre disposición servicios gratuitos de asistencia lingüística. Llame al [phone].    We comply with applicable federal civil rights laws and [place] laws. We do not discriminate on the basis of race, color, national origin, age, disability, sex, sexual orientation, or gender identity.            Thank you!     Thank you for choosing [facility]  for your care. Our goal is always to provide you with excellent care. Hearing back from our patients is one way we can continue to improve our services. Please take a few minutes to complete the written survey that you may receive in the mail after your visit with us. Thank you!             Your Updated Medication List - Protect others around you: Learn how to safely use, store and throw away your medicines at www.disposemymeds.org.          This list is accurate as of: 12/6/17 11:23 AM.  Always use your most recent med list.                   Brand Name Dispense Instructions for use Diagnosis    amitriptyline 10 MG tablet    ELAVIL    270 tablet    Take 3 tablets (30 mg) by mouth At Bedtime    Abdominal pain, right lower quadrant       CITRUCEL PO           DAILY MULTIVITAMIN PO      Take  by mouth daily.        IBUPROFEN PO      Take 200 mg by mouth        METAMUCIL FIBER PO           MIRALAX PO           norgestimate-ethinyl estradiol 0.25-35 MG-MCG per tablet    ORTHO-CYCLEN, SPRINTEC    84 tablet    Take 1 tablet by mouth daily    Cyst of right ovary       TYLENOL PO      Take 320 mg by mouth as needed        UNABLE TO FIND       MEDICATION NAME: Heathers

## 2017-12-06 NOTE — PATIENT INSTRUCTIONS
Biopsy - How to take care of the site?     The area should be covered with the original band-Aid and kept dry for the first 24 hours after the procedure. After the first 24 hours, you can gently clean the area with soap and water. Rinse well and pat dry. When showering, do not let the full force of the water come in contact with the biopsy site. This area should be covered with Vaseline and a band-Aid until the stitch is removed (If a suture is applied). This band-Aid should be replaced with fresh Vaseline applied daily (in either case-suture or no suture).    Keep the biopsy area dry for 24 hours and covered with a band-aid.    Apply the vaseline twice a day with a cotton swab or a clean finger, and keep the site covered with a band-aid.     If you are unable to cover the site with a band-aid, re-apply ointment 2-3 times a day.    The best time to do wound care is after a shower or bath. You may shower or bathe the day after the biopsy and you can get the site wet. However, keep the force of the water off the biopsy site. Do not soak the area in water, and change the band-aid if it gets wet.     A small scab will form and fall off by itself when the area is completely healed. The area will be red, and will become pink in color as it heals.     If any discomfort occurs after the local anesthetic wears off, acetaminophen (i.e. Tylenol) may be given.    If a small amount of bleeding is noticed, place a clean cloth over the area and apply constant firm pressure for ten minutes-- no peeking! Should the bleeding become heavier or not stop, call the clinic.    Call us if:    You have signs of an infection:    Thick, yellow or pus-like wound drainage (clear, or slightly yellow drainage is ok)    Fevers greater than 100 degrees Fahrenheit    Spreading redness or warmth at the biopsy site

## 2017-12-06 NOTE — PROGRESS NOTES
HPI  Cyst      Duration: x1 year    Description (location/character/radiation): left back, went away after popping it and came back during the last 6 months.  It is rubbing on her bra strap and is irritated.  She would like it removed . It has not drained lately.  It is not painful or red.     Intensity:  mild    Accompanying signs and symptoms: None     History (similar episodes/previous evaluation): None    Precipitating or alleviating factors: None    Therapies tried and outcome: None    Review of Systems   Constitutional: Negative for chills and fever.   Skin: Negative for itching.       Allergies   Allergen Reactions     Milk [Lac Bovis]      Sulfa Drugs      unsure of reaction, from early childhood     Current Outpatient Prescriptions   Medication     amitriptyline (ELAVIL) 10 MG tablet     Psyllium (METAMUCIL FIBER PO)     Methylcellulose, Laxative, (CITRUCEL PO)     norgestimate-ethinyl estradiol (ORTHO-CYCLEN, SPRINTEC) 0.25-35 MG-MCG per tablet     Polyethylene Glycol 3350 (MIRALAX PO)     UNABLE TO FIND     IBUPROFEN PO     Acetaminophen (TYLENOL PO)     Multiple Vitamin (DAILY MULTIVITAMIN PO)     No current facility-administered medications for this visit.      Active Ambulatory Problems     Diagnosis Date Noted     Contraceptive management 01/24/2006     CARDIOVASCULAR SCREENING; LDL GOAL LESS THAN 160 10/31/2010     Irritable bowel syndrome without diarrhea 02/15/2016     Milk allergy 03/01/2016     Chronic right-sided low back pain with right-sided sciatica 09/21/2016     Benign neoplasm of colon 05/22/2017     Resolved Ambulatory Problems     Diagnosis Date Noted     Papanicolaou smear of cervix with low grade squamous intraepithelial lesion (LGSIL) 03/24/2008     Pain in joint of right shoulder 09/11/2015     Past Medical History:   Diagnosis Date     LSIL (low grade squamous intraepithelial lesion) on Pap smear 2/2008,11/2012       Physical Exam   Constitutional: She is well-developed,  well-nourished, and in no distress.   Cardiovascular: Normal rate, regular rhythm and normal heart sounds.  Exam reveals no gallop and no friction rub.    No murmur heard.  Pulmonary/Chest: Effort normal and breath sounds normal. No respiratory distress. She has no wheezes. She has no rales.   Skin: She is not diaphoretic.   There is a mobile nodule that is 8mm to her left upper back; normal overlying skin, a whitish coloration, no drainage, erythema, warmth or tenderness.     Psychiatric: Affect normal.   Vitals reviewed.    BP 96/65 (BP Location: Left arm, Patient Position: Sitting, Cuff Size: Adult Regular)  Pulse 61  Temp 98.3  F (36.8  C) (Tympanic)  Resp 18  Wt 125 lb (56.7 kg)  LMP 11/15/2017 (Approximate)  SpO2 97%  BMI 23.62 kg/m2    A/P  Epidermal cyst to the back: reviewed benign lesion, given that it is growing and getting irritated on her bra strap, she would like it removed anyway.  After obtaining signed consent, the area was prepped with betdyne and local anesthesia was achieved with 0.5mL lidocaine 2% with epi.  A small incision was made with a scalpel blade and the cyst was extruded, mostly intact.  Hemostasis achieved with 1 suture.  No complications, the patient tolerated the procedure well.  I reviewed the aftercare instructions with her on AVS and have asked her to f/u in one week for nurse visit for suture removal.

## 2017-12-07 ASSESSMENT — ANXIETY QUESTIONNAIRES: GAD7 TOTAL SCORE: 0

## 2017-12-08 LAB — COPATH REPORT: NORMAL

## 2017-12-13 ENCOUNTER — ALLIED HEALTH/NURSE VISIT (OUTPATIENT)
Dept: NURSING | Facility: CLINIC | Age: 42
End: 2017-12-13
Payer: COMMERCIAL

## 2017-12-13 DIAGNOSIS — Z48.02 VISIT FOR SUTURE REMOVAL: Primary | ICD-10-CM

## 2017-12-13 PROCEDURE — 99207 ZZC NO CHARGE NURSE ONLY: CPT

## 2017-12-13 NOTE — MR AVS SNAPSHOT
After Visit Summary   12/13/2017    Bee Norris    MRN: [id number]           Patient Information     Date Of Birth          1975        Visit Information        Provider Department      12/13/2017 11:00 AM HP RN/TRIAGE NURSE ONLY [facility]        Today's Diagnoses     Visit for suture removal    -  1       Follow-ups after your visit        Who to contact     If you have questions or need follow up information about today's clinic visit or your schedule please contact [facility] directly at [phone].  Normal or non-critical lab and imaging results will be communicated to you by Stageithart, letter or phone within 4 business days after the clinic has received the results. If you do not hear from us within 7 days, please contact the clinic through BoardVitalst or phone. If you have a critical or abnormal lab result, we will notify you by phone as soon as possible.  Submit refill requests through [employer] or call your pharmacy and they will forward the refill request to us. Please allow 3 business days for your refill to be completed.          Additional Information About Your Visit        MyChart Information     [employer] gives you secure access to your electronic health record. If you see a primary care provider, you can also send messages to your care team and make appointments. If you have questions, please call your primary care clinic.  If you do not have a primary care provider, please call [phone] and they will assist you.        Care EveryWhere ID     This is your Care EveryWhere ID. This could be used by other organizations to access your [place] medical records  [id number]        Your Vitals Were     Last Period                   11/15/2017 (Approximate)            Blood Pressure from Last 3 Encounters:   12/06/17 96/65   11/30/17 108/72   05/22/17 112/67    Weight from Last 3 Encounters:   12/06/17 125 lb (56.7 kg)   11/30/17 123 lb 4 oz (55.9  kg)   05/22/17 126 lb (57.2 kg)              Today, you had the following     No orders found for display       Primary Care Provider Office Phone # Fax #    Shayy Linares [phone] [address]  [place] [address]        Equal Access to Services     [facility]SA : Hadii aad ku hadasho Soomaali, waaxda luqadaha, qaybta kaalmada adeegyada, waxay shantanuin hayaan ademoises banegasgarimamichael langston . So [facility] [phone].    ATENCIÓN: Si habla español, tiene a delatorre disposición servicios gratuitos de asistencia lingüística. Albertame al [phone].    We comply with applicable federal civil rights laws and [place] laws. We do not discriminate on the basis of race, color, national origin, age, disability, sex, sexual orientation, or gender identity.            Thank you!     Thank you for choosing [facility]  for your care. Our goal is always to provide you with excellent care. Hearing back from our patients is one way we can continue to improve our services. Please take a few minutes to complete the written survey that you may receive in the mail after your visit with us. Thank you!             Your Updated Medication List - Protect others around you: Learn how to safely use, store and throw away your medicines at www.disposemymeds.org.          This list is accurate as of: 12/13/17 11:28 AM.  Always use your most recent med list.                   Brand Name Dispense Instructions for use Diagnosis    amitriptyline 10 MG tablet    ELAVIL    270 tablet    Take 3 tablets (30 mg) by mouth At Bedtime    Abdominal pain, right lower quadrant       CITRUCEL PO           DAILY MULTIVITAMIN PO      Take  by mouth daily.        IBUPROFEN PO      Take 200 mg by mouth        METAMUCIL FIBER PO           MIRALAX PO           norgestimate-ethinyl estradiol 0.25-35 MG-MCG per tablet    ORTHO-CYCLEN, SPRINTEC    84 tablet    Take 1 tablet by mouth daily    Cyst of right ovary       TYLENOL PO       Take 320 mg by mouth as needed        UNABLE TO FIND      MEDICATION NAME: Shannan

## 2017-12-13 NOTE — NURSING NOTE
Bee presents to the clinic today for  removal of suture.  The patient has had the suture in place for 7 days.    There has been no history of infection or drainage.    O: 1 suture are seen located on the left back-under the scapula.  The wound is healing well with no signs of infection.    Tetanus status is up to date.    A: Suture removal.    P:  The suture were easily removed today.  Routine wound care discussed.  The patient will follow up as needed. bandaid and ointment placed on lesion- per pt request.Paula Boucher RN

## 2017-12-28 ENCOUNTER — OFFICE VISIT (OUTPATIENT)
Dept: FAMILY MEDICINE | Facility: CLINIC | Age: 42
End: 2017-12-28
Payer: COMMERCIAL

## 2017-12-28 VITALS
OXYGEN SATURATION: 98 % | HEART RATE: 70 BPM | RESPIRATION RATE: 14 BRPM | TEMPERATURE: 98 F | DIASTOLIC BLOOD PRESSURE: 64 MMHG | SYSTOLIC BLOOD PRESSURE: 98 MMHG | BODY MASS INDEX: 22.66 KG/M2 | HEIGHT: 61 IN | WEIGHT: 120 LBS

## 2017-12-28 DIAGNOSIS — N89.8 VAGINAL ODOR: Primary | ICD-10-CM

## 2017-12-28 LAB
SPECIMEN SOURCE: NORMAL
WET PREP SPEC: NORMAL

## 2017-12-28 PROCEDURE — 87210 SMEAR WET MOUNT SALINE/INK: CPT | Performed by: NURSE PRACTITIONER

## 2017-12-28 PROCEDURE — 99213 OFFICE O/P EST LOW 20 MIN: CPT | Performed by: NURSE PRACTITIONER

## 2017-12-28 NOTE — LETTER
[address]. [facility]  [address]  [place]  [address]  Tel: [phone]    December 28, 2017    Bee Norris  [address] [place] [address]        Dear Ms. Norris,    Here are your results from your office visit.     Resulted Orders   Wet prep   Result Value Ref Range    Specimen Description Vagina     Wet Prep No Trichomonas seen     Wet Prep No clue cells seen     Wet Prep No yeast seen        If you have any further questions or problems, please contact our office.      Sincerely,    Cynthia Crisostomo NP / addison

## 2017-12-28 NOTE — PROGRESS NOTES
SUBJECTIVE:   Bee Norris is a 42 year old female who presents to clinic today for the following health issues:      Vaginal Symptoms      Duration: 3 days of unusual odor when toileting.  No urinary pain or frequency or urgency, no blood or cloudiness to urine.  No vaginal discharge or itching or burning.  No know std risk    Description  odor    Intensity:  mild    Accompanying signs and symptoms (fever/dysuria/abdominal or back pain): None    History  Sexually active: yes, single partner, contraception - oral contraceptives (combined)  Possibility of pregnancy: No  Recent antibiotic use: no     Precipitating or alleviating factors: has been eating a lot of brussel sprouts the past 3 days    Therapies tried and outcome: none   Outcome: na    Problem list and histories reviewed & adjusted, as indicated.  Additional history: as documented    Patient Active Problem List   Diagnosis     Contraceptive management     CARDIOVASCULAR SCREENING; LDL GOAL LESS THAN 160     Irritable bowel syndrome without diarrhea     Milk allergy     Chronic right-sided low back pain with right-sided sciatica     Benign neoplasm of colon     Past Surgical History:   Procedure Laterality Date     BIOPSY  2016    Colonoscapy     C APPENDECTOMY  1983     COLONOSCOPY N/A 8/9/2016    Procedure: COMBINED COLONOSCOPY, SINGLE OR MULTIPLE BIOPSY/POLYPECTOMY BY BIOPSY;  Surgeon: Bee Paul MD;  Location: [facility] OR     COLONOSCOPY WITH CO2 INSUFFLATION N/A 8/9/2016    Procedure: COLONOSCOPY WITH CO2 INSUFFLATION;  Surgeon: Bee Paul MD;  Location: [facility] OR     HC TOOTH EXTRACTION W/FORCEP         Social History   Substance Use Topics     Smoking status: Never Smoker     Smokeless tobacco: Never Used     Alcohol use No      Comment: rarely     Family History   Problem Relation Age of Onset     Adopted: Yes     Unknown/Adopted Other      Unknown/Adopted Mother      Unknown/Adopted Father      Unknown/Adopted  "Maternal Grandmother      Unknown/Adopted Maternal Grandfather      Unknown/Adopted Paternal Grandmother      Unknown/Adopted Paternal Grandfather      Unknown/Adopted Brother      Unknown/Adopted Sister      Unknown/Adopted Son      Unknown/Adopted Daughter      Unknown/Adopted Other          Current Outpatient Prescriptions   Medication Sig Dispense Refill     amitriptyline (ELAVIL) 10 MG tablet Take 3 tablets (30 mg) by mouth At Bedtime 270 tablet 2     norgestimate-ethinyl estradiol (ORTHO-CYCLEN, SPRINTEC) 0.25-35 MG-MCG per tablet Take 1 tablet by mouth daily 84 tablet 3     Psyllium (METAMUCIL FIBER PO)        Methylcellulose, Laxative, (CITRUCEL PO)        Polyethylene Glycol 3350 (MIRALAX PO)        UNABLE TO FIND MEDICATION NAME: Heathers       IBUPROFEN PO Take 200 mg by mouth       Acetaminophen (TYLENOL PO) Take 320 mg by mouth as needed        Multiple Vitamin (DAILY MULTIVITAMIN PO) Take  by mouth daily.       Allergies   Allergen Reactions     Milk [Lac Bovis]      Sulfa Drugs      unsure of reaction, from early childhood         Reviewed and updated as needed this visit by clinical staffTobacco  Allergies       Reviewed and updated as needed this visit by Provider         ROS:  Constitutional, HEENT, cardiovascular, pulmonary, gi and gu systems are negative, except as otherwise noted.      OBJECTIVE:   BP 98/64  Pulse 70  Temp 98  F (36.7  C) (Oral)  Resp 14  Ht 5' 1\" (1.549 m)  Wt 120 lb (54.4 kg)  LMP 12/14/2017  SpO2 98%  BMI 22.67 kg/m2  Body mass index is 22.67 kg/(m^2).  GENERAL: healthy, alert and no distress  ABDOMEN: soft, nontender,   Diagnostic Test Results:  Results for orders placed or performed in visit on 12/28/17   Wet prep   Result Value Ref Range    Specimen Description Vagina     Wet Prep No Trichomonas seen     Wet Prep No clue cells seen     Wet Prep No yeast seen        ASSESSMENT/PLAN:       ICD-10-CM    1. Vaginal odor N89.8 Wet prep   Because she really only " notices this while toileting it may be a urinary odor due to increased volume of brussel sprouts  She is advised to wait and watch and contact me for further advise prCORA Sandoval CNP  [facility]

## 2017-12-28 NOTE — MR AVS SNAPSHOT
"              After Visit Summary   12/28/2017    Bee Norris    MRN: [id number]           Patient Information     Date Of Birth          1975        Visit Information        Provider Department      12/28/2017 11:00 AM Cynthia Crisostomo APRN CNP [facility]        Today's Diagnoses     Vaginal odor    -  1       Follow-ups after your visit        Follow-up notes from your care team     Return if symptoms worsen or fail to improve.      Who to contact     If you have questions or need follow up information about today's clinic visit or your schedule please contact [facility] directly at [phone].  Normal or non-critical lab and imaging results will be communicated to you by Studio Publishing[employer], letter or phone within 4 business days after the clinic has received the results. If you do not hear from us within 7 days, please contact the clinic through [employer] or phone. If you have a critical or abnormal lab result, we will notify you by phone as soon as possible.  Submit refill requests through [employer] or call your pharmacy and they will forward the refill request to us. Please allow 3 business days for your refill to be completed.          Additional Information About Your Visit        MyChart Information     [employer] gives you secure access to your electronic health record. If you see a primary care provider, you can also send messages to your care team and make appointments. If you have questions, please call your primary care clinic.  If you do not have a primary care provider, please call [phone] and they will assist you.        Care EveryWhere ID     This is your Care EveryWhere ID. This could be used by other organizations to access your [facility] medical records  [id number]        Your Vitals Were     Pulse Temperature Respirations Height Last Period Pulse Oximetry    70 98  F (36.7  C) (Oral) 14 5' 1\" (1.549 m) 12/14/2017 98%    BMI (Body Mass Index)                   22.67 kg/m2 "            Blood Pressure from Last 3 Encounters:   12/28/17 98/64   12/06/17 96/65   11/30/17 108/72    Weight from Last 3 Encounters:   12/28/17 120 lb (54.4 kg)   12/06/17 125 lb (56.7 kg)   11/30/17 123 lb 4 oz (55.9 kg)              We Performed the Following     Wet prep        Primary Care Provider Office Phone # Fax #    Shayy Linares [phone] [address] [place] [address]        Equal Access to Services     [facility]: Hadii aad ku hadasho Soomaali, waaxda luqadaha, qaybta kaalmada adelesly, ursula langston . So [facility] [phone].    ATENCIÓN: Si habla español, tiene a delatorre disposición servicios gratuitos de asistencia lingüística. [facility] [phone].    We comply with applicable federal civil rights laws and [place] laws. We do not discriminate on the basis of race, color, national origin, age, disability, sex, sexual orientation, or gender identity.            Thank you!     Thank you for choosing [facility]  for your care. Our goal is always to provide you with excellent care. Hearing back from our patients is one way we can continue to improve our services. Please take a few minutes to complete the written survey that you may receive in the mail after your visit with us. Thank you!             Your Updated Medication List - Protect others around you: Learn how to safely use, store and throw away your medicines at www.disposemymeds.org.          This list is accurate as of: 12/28/17 11:44 AM.  Always use your most recent med list.                   Brand Name Dispense Instructions for use Diagnosis    amitriptyline 10 MG tablet    ELAVIL    270 tablet    Take 3 tablets (30 mg) by mouth At Bedtime    Abdominal pain, right lower quadrant       CITRUCEL PO           DAILY MULTIVITAMIN PO      Take  by mouth daily.        IBUPROFEN PO      Take 200 mg by mouth        METAMUCIL FIBER PO           MIRALAX PO            norgestimate-ethinyl estradiol 0.25-35 MG-MCG per tablet    ORTHO-CYCLEN, SPRINTEC    84 tablet    Take 1 tablet by mouth daily    Cyst of right ovary       TYLENOL PO      Take 320 mg by mouth as needed        UNABLE TO FIND      MEDICATION NAME: Shannan

## 2017-12-28 NOTE — NURSING NOTE
"Chief Complaint   Patient presents with     Vaginal Problem     odor x 3 days.       Initial BP 98/64  Pulse 70  Temp 98  F (36.7  C) (Oral)  Resp 14  Ht 5' 1\" (1.549 m)  Wt 120 lb (54.4 kg)  LMP 12/14/2017  SpO2 98%  BMI 22.67 kg/m2 Estimated body mass index is 22.67 kg/(m^2) as calculated from the following:    Height as of this encounter: 5' 1\" (1.549 m).    Weight as of this encounter: 120 lb (54.4 kg).  Medication Reconciliation: complete  "

## 2018-05-03 ENCOUNTER — TRANSFERRED RECORDS (OUTPATIENT)
Dept: HEALTH INFORMATION MANAGEMENT | Facility: CLINIC | Age: 43
End: 2018-05-03

## 2018-05-10 DIAGNOSIS — N83.201 CYST OF RIGHT OVARY: ICD-10-CM

## 2018-05-11 RX ORDER — NORGESTIMATE AND ETHINYL ESTRADIOL
KIT
Qty: 84 TABLET | Refills: 0 | Status: SHIPPED | OUTPATIENT
Start: 2018-05-11 | End: 2018-08-02

## 2018-05-11 NOTE — TELEPHONE ENCOUNTER
LOV: 12/28/2017    Prescription approved per [facility] Refill Protocol.  Thanks! Supriya Pichardo RN

## 2018-05-22 ENCOUNTER — OFFICE VISIT (OUTPATIENT)
Dept: FAMILY MEDICINE | Facility: CLINIC | Age: 43
End: 2018-05-22
Payer: COMMERCIAL

## 2018-05-22 VITALS
BODY MASS INDEX: 23.86 KG/M2 | HEART RATE: 65 BPM | WEIGHT: 126.38 LBS | TEMPERATURE: 98.7 F | OXYGEN SATURATION: 99 % | SYSTOLIC BLOOD PRESSURE: 106 MMHG | RESPIRATION RATE: 18 BRPM | HEIGHT: 61 IN | DIASTOLIC BLOOD PRESSURE: 67 MMHG

## 2018-05-22 DIAGNOSIS — R05.9 COUGH: Primary | ICD-10-CM

## 2018-05-22 PROCEDURE — 99213 OFFICE O/P EST LOW 20 MIN: CPT | Performed by: NURSE PRACTITIONER

## 2018-05-22 RX ORDER — CODEINE PHOSPHATE AND GUAIFENESIN 10; 100 MG/5ML; MG/5ML
1-2 SOLUTION ORAL
Qty: 120 ML | Refills: 0 | Status: SHIPPED | OUTPATIENT
Start: 2018-05-22 | End: 2019-02-14

## 2018-05-22 RX ORDER — ALBUTEROL SULFATE 90 UG/1
2 AEROSOL, METERED RESPIRATORY (INHALATION) EVERY 4 HOURS PRN
Qty: 1 INHALER | Refills: 0 | Status: SHIPPED | OUTPATIENT
Start: 2018-05-22 | End: 2019-02-14

## 2018-05-22 RX ORDER — BENZONATATE 200 MG/1
200 CAPSULE ORAL 3 TIMES DAILY PRN
Qty: 30 CAPSULE | Refills: 0 | Status: SHIPPED | OUTPATIENT
Start: 2018-05-22 | End: 2019-02-14

## 2018-05-22 NOTE — PROGRESS NOTES
"  SUBJECTIVE:   Bee Norris is a 43 year old female who presents to clinic today for the following health issues:      RESPIRATORY SYMPTOMS      Duration: 2 weeks     Description  Dry cough for 2 weeks getting progressively worse. Cough impacting sleep, has not had a good night sleep in 5 days, headache, some nasal congestion and feeling a little nauseous. No fever   Concerned because traveling on Friday     Severity: progressively worst: severe at night when trying to sleep     Accompanying signs and symptoms: waking herself up coughing, difficult to catch breath. Cough is worst at night when she lays down.      History (predisposing factors):  none    Precipitating or alleviating factors:     Therapies tried and outcome: Delsym for cough has not really helped     No history of seasonal allergies.    She is leaving on vacation.            Problem list and histories reviewed & adjusted, as indicated.  Additional history: as documented        Reviewed and updated as needed this visit by clinical staff       Reviewed and updated as needed this visit by Provider           OBJECTIVE:     /67  Pulse 65  Temp 98.7  F (37.1  C) (Oral)  Resp 18  Ht 5' 0.75\" (1.543 m)  Wt 126 lb 6 oz (57.3 kg)  SpO2 99%  BMI 24.08 kg/m2  Body mass index is 24.08 kg/(m^2).  GENERAL: healthy, alert and no distress  EYES: Eyes grossly normal to inspection, PERRL and conjunctivae and sclerae normal  HENT: ear canals and TM's normal, nose and mouth without ulcers or lesions  NECK: no adenopathy, no asymmetry, masses, or scars and thyroid normal to palpation  RESP: lungs clear to auscultation - no rales, rhonchi or wheezes  CV: regular rate and rhythm, normal S1 S2, no S3 or S4, no murmur, click or rub, no peripheral edema and peripheral pulses strong  ABDOMEN: soft, nontender, no hepatosplenomegaly, no masses and bowel sounds normal  PSYCH: mentation appears normal, affect normal/bright        ASSESSMENT/PLAN:             1. " Cough  Likely viral bronchitis  Discussed the use and indication of this medication as well as potential side effects.   - albuterol (PROAIR HFA/PROVENTIL HFA/VENTOLIN HFA) 108 (90 Base) MCG/ACT Inhaler; Inhale 2 puffs into the lungs every 4 hours as needed for shortness of breath / dyspnea or wheezing  Dispense: 1 Inhaler; Refill: 0  - guaiFENesin-codeine (ROBITUSSIN AC) 100-10 MG/5ML SOLN solution; Take 5-10 mLs by mouth nightly as needed for cough  Dispense: 120 mL; Refill: 0  - benzonatate (TESSALON) 200 MG capsule; Take 1 capsule (200 mg) by mouth 3 times daily as needed for cough  Dispense: 30 capsule; Refill: 0    Return to clinic if symptoms persist or worsen.     Corrine Quintanilla NP  [facility]

## 2018-05-22 NOTE — MR AVS SNAPSHOT
"              After Visit Summary   5/22/2018    Bee Norris    MRN: [id number]           Patient Information     Date Of Birth          1975        Visit Information        Provider Department      5/22/2018 8:30 AM Corrine Quintanilla NP [facility]        Today's Diagnoses     Cough    -  1       Follow-ups after your visit        Who to contact     If you have questions or need follow up information about today's clinic visit or your schedule please contact [facility] directly at [phone].  Normal or non-critical lab and imaging results will be communicated to you by Launchrhart, letter or phone within 4 business days after the clinic has received the results. If you do not hear from us within 7 days, please contact the clinic through PodPonic[employer] or phone. If you have a critical or abnormal lab result, we will notify you by phone as soon as possible.  Submit refill requests through [facility] or call your pharmacy and they will forward the refill request to us. Please allow 3 business days for your refill to be completed.          Additional Information About Your Visit        MyChart Information     [facility] gives you secure access to your electronic health record. If you see a primary care provider, you can also send messages to your care team and make appointments. If you have questions, please call your primary care clinic.  If you do not have a primary care provider, please call [phone] and they will assist you.        Care EveryWhere ID     This is your Care EveryWhere ID. This could be used by other organizations to access your [facility] medical records  [id number]        Your Vitals Were     Pulse Temperature Respirations Height Pulse Oximetry BMI (Body Mass Index)    65 98.7  F (37.1  C) (Oral) 18 5' 0.75\" (1.543 m) 99% 24.08 kg/m2       Blood Pressure from Last 3 Encounters:   05/22/18 106/67   12/28/17 98/64   12/06/17 96/65    Weight from Last 3 " Encounters:   05/22/18 126 lb 6 oz (57.3 kg)   12/28/17 120 lb (54.4 kg)   12/06/17 125 lb (56.7 kg)              Today, you had the following     No orders found for display         Today's Medication Changes          These changes are accurate as of 5/22/18  9:31 AM.  If you have any questions, ask your nurse or doctor.               Start taking these medicines.        Dose/Directions    albuterol 108 (90 Base) MCG/ACT Inhaler   Commonly known as:  PROAIR HFA/PROVENTIL HFA/VENTOLIN HFA   Used for:  Cough   Started by:  Corrine Quintanilla, NP        Dose:  2 puff   Inhale 2 puffs into the lungs every 4 hours as needed for shortness of breath / dyspnea or wheezing   Quantity:  1 Inhaler   Refills:  0       benzonatate 200 MG capsule   Commonly known as:  TESSALON   Used for:  Cough   Started by:  Corrine Quintanilla NP        Dose:  200 mg   Take 1 capsule (200 mg) by mouth 3 times daily as needed for cough   Quantity:  30 capsule   Refills:  0       guaiFENesin-codeine 100-10 MG/5ML Soln solution   Commonly known as:  ROBITUSSIN AC   Used for:  Cough   Started by:  Corrine Quintanilla NP        Dose:  1-2 tsp.   Take 5-10 mLs by mouth nightly as needed for cough   Quantity:  120 mL   Refills:  0            Where to get your medicines      These medications were sent to [id number] - [place], [place] - [address] AT [facility]  [address], [place] [address]     Phone:  [phone]     albuterol 108 (90 Base) MCG/ACT Inhaler    benzonatate 200 MG capsule         Some of these will need a paper prescription and others can be bought over the counter.  Ask your nurse if you have questions.     Bring a paper prescription for each of these medications     guaiFENesin-codeine 100-10 MG/5ML Soln solution                Primary Care Provider Office Phone # Fax #    Shayy Linares [phone] [address] [place] [address]        Equal Access to  Services     [facility]: Hadii ayesha pepper leoneltamie Radhaali, waaxda luqadaha, qaybta kaalmada liliammisaelheraclio, ursula langston . So [facility] [phone].    ATENCIÓN: Si dianala arcelia, tiene a delatorre disposición servicios gratuitos de asistencia lingüística. Llame al [phone].    We comply with applicable federal civil rights laws and [place] laws. We do not discriminate on the basis of race, color, national origin, age, disability, sex, sexual orientation, or gender identity.            Thank you!     Thank you for choosing [facility]  for your care. Our goal is always to provide you with excellent care. Hearing back from our patients is one way we can continue to improve our services. Please take a few minutes to complete the written survey that you may receive in the mail after your visit with us. Thank you!             Your Updated Medication List - Protect others around you: Learn how to safely use, store and throw away your medicines at www.disposemymeds.org.          This list is accurate as of 5/22/18  9:31 AM.  Always use your most recent med list.                   Brand Name Dispense Instructions for use Diagnosis    albuterol 108 (90 Base) MCG/ACT Inhaler    PROAIR HFA/PROVENTIL HFA/VENTOLIN HFA    1 Inhaler    Inhale 2 puffs into the lungs every 4 hours as needed for shortness of breath / dyspnea or wheezing    Cough       amitriptyline 10 MG tablet    ELAVIL    270 tablet    Take 3 tablets (30 mg) by mouth At Bedtime    Abdominal pain, right lower quadrant       benzonatate 200 MG capsule    TESSALON    30 capsule    Take 1 capsule (200 mg) by mouth 3 times daily as needed for cough    Cough       CITRUCEL PO           DAILY MULTIVITAMIN PO      Take  by mouth daily.        guaiFENesin-codeine 100-10 MG/5ML Soln solution    ROBITUSSIN AC    120 mL    Take 5-10 mLs by mouth nightly as needed for cough    Cough       IBUPROFEN PO      Take 200 mg by mouth as needed         MIRALAX PO      Take by mouth as needed        PREVIFEM 0.25-35 MG-MCG per tablet   Generic drug:  norgestimate-ethinyl estradiol     84 tablet    TAKE 1 TABLET DAILY    Cyst of right ovary       TYLENOL PO      Take 320 mg by mouth as needed

## 2018-05-24 ENCOUNTER — TELEPHONE (OUTPATIENT)
Dept: FAMILY MEDICINE | Facility: CLINIC | Age: 43
End: 2018-05-24

## 2018-05-24 NOTE — TELEPHONE ENCOUNTER
"Nisha Quintanilla CNFP DNP   Pt requesting a note to cancel flight scheduled for 5/25 due to illness. Patient was seen on 5/22 for viral bronchitis.     Plan: \"1. Cough  Likely viral bronchitis  Discussed the use and indication of this medication as well as potential side effects.   - albuterol (PROAIR HFA/PROVENTIL HFA/VENTOLIN HFA) 108 (90 Base) MCG/ACT Inhaler; Inhale 2 puffs into the lungs every 4 hours as needed for shortness of breath / dyspnea or wheezing  Dispense: 1 Inhaler; Refill: 0  - guaiFENesin-codeine (ROBITUSSIN AC) 100-10 MG/5ML SOLN solution; Take 5-10 mLs by mouth nightly as needed for cough  Dispense: 120 mL; Refill: 0  - benzonatate (TESSALON) 200 MG capsule; Take 1 capsule (200 mg) by mouth 3 times daily as needed for cough  Dispense: 30 capsule; Refill: 0     Return to clinic if symptoms persist or worsen. \"      Letter richard'd up. Please review/ sign if agree.     Thanks! Supriya Pichardo RN       "

## 2018-05-24 NOTE — TELEPHONE ENCOUNTER
Reason for Call:  Note    Detailed comments: Pt was seen on 5/22 for an illness and is scheduled to fly on 5/25 but is still not feeling well and she needs a medical note to cancel her flight. Pt will  note    Phone Number Patient can be reached at: Cell number on file:    Telephone Information:   Mobile [phone]       Best Time: anytime    Can we leave a detailed message on this number? YES    Call taken on 5/24/2018 at 8:46 AM by Montserrat Metzger

## 2018-05-24 NOTE — LETTER
[facility]  [address] [place] [id number]  [phone]          May 24, 2018    Bee Norris                                                                                                                     [address] [place] [address]            To whom this may concern.   It is of my professional medical opinion that Bee Norris not travel at this time due to illness.         Sincerely,         Corrine Quintanilla CNP

## 2018-05-29 ENCOUNTER — TELEPHONE (OUTPATIENT)
Dept: FAMILY MEDICINE | Facility: CLINIC | Age: 43
End: 2018-05-29

## 2018-05-29 NOTE — TELEPHONE ENCOUNTER
Reason for Call:  Other-F/U DOS 05/22/2018    Detailed comments: Pt would like to follow up with STACY's team regarding her visit on DOS 05/22/2018. She states it's not getting any better. If anything, possibly worst. She does have an appt with [facility] this Thursday at 9am.. Pt would like to see if she can receive a response before then. Thanks!    Phone Number Patient can be reached at: Cell number on file:    Telephone Information:   Mobile [phone]       Best Time: Anytime    Can we leave a detailed message on this number? YES    Call taken on 5/29/2018 at 8:39 AM by Tisha Hyman

## 2018-05-30 ENCOUNTER — OFFICE VISIT (OUTPATIENT)
Dept: FAMILY MEDICINE | Facility: CLINIC | Age: 43
End: 2018-05-30
Payer: COMMERCIAL

## 2018-05-30 ENCOUNTER — RADIANT APPOINTMENT (OUTPATIENT)
Dept: GENERAL RADIOLOGY | Facility: CLINIC | Age: 43
End: 2018-05-30
Attending: FAMILY MEDICINE
Payer: COMMERCIAL

## 2018-05-30 VITALS
TEMPERATURE: 98.7 F | DIASTOLIC BLOOD PRESSURE: 78 MMHG | HEART RATE: 66 BPM | OXYGEN SATURATION: 100 % | SYSTOLIC BLOOD PRESSURE: 114 MMHG | WEIGHT: 125 LBS | RESPIRATION RATE: 18 BRPM | BODY MASS INDEX: 23.81 KG/M2

## 2018-05-30 DIAGNOSIS — R05.9 COUGH: Primary | ICD-10-CM

## 2018-05-30 DIAGNOSIS — R05.9 COUGH: ICD-10-CM

## 2018-05-30 PROCEDURE — 71046 X-RAY EXAM CHEST 2 VIEWS: CPT

## 2018-05-30 PROCEDURE — 99213 OFFICE O/P EST LOW 20 MIN: CPT | Performed by: FAMILY MEDICINE

## 2018-05-30 RX ORDER — OMEPRAZOLE 40 MG/1
40 CAPSULE, DELAYED RELEASE ORAL DAILY
Qty: 30 CAPSULE | Refills: 1 | Status: SHIPPED | OUTPATIENT
Start: 2018-05-30 | End: 2019-02-14

## 2018-05-30 RX ORDER — FLUTICASONE PROPIONATE 50 MCG
1-2 SPRAY, SUSPENSION (ML) NASAL DAILY
Qty: 1 BOTTLE | Refills: 11 | Status: SHIPPED | OUTPATIENT
Start: 2018-05-30 | End: 2019-02-14

## 2018-05-30 NOTE — MR AVS SNAPSHOT
After Visit Summary   5/30/2018    Bee Norris    MRN: [id number]           Patient Information     Date Of Birth          1975        Visit Information        Provider Department      5/30/2018 2:20 PM Shayy Linares MD [facility]        Today's Diagnoses     Cough    -  1       Follow-ups after your visit        Who to contact     If you have questions or need follow up information about today's clinic visit or your schedule please contact [facility] directly at [phone].  Normal or non-critical lab and imaging results will be communicated to you by Managed Objectshart, letter or phone within 4 business days after the clinic has received the results. If you do not hear from us within 7 days, please contact the clinic through e-Rewardst or phone. If you have a critical or abnormal lab result, we will notify you by phone as soon as possible.  Submit refill requests through InviteDEV or call your pharmacy and they will forward the refill request to us. Please allow 3 business days for your refill to be completed.          Additional Information About Your Visit        MyChart Information     [employer] gives you secure access to your electronic health record. If you see a primary care provider, you can also send messages to your care team and make appointments. If you have questions, please call your primary care clinic.  If you do not have a primary care provider, please call [phone] and they will assist you.        Care EveryWhere ID     This is your Care EveryWhere ID. This could be used by other organizations to access your [place] medical records  [id number]        Your Vitals Were     Pulse Temperature Respirations Pulse Oximetry BMI (Body Mass Index)       66 98.7  F (37.1  C) (Oral) 18 100% 23.81 kg/m2        Blood Pressure from Last 3 Encounters:   05/30/18 114/78   05/22/18 106/67   12/28/17 98/64    Weight from Last 3 Encounters:   05/30/18 125 lb  (56.7 kg)   05/22/18 126 lb 6 oz (57.3 kg)   12/28/17 120 lb (54.4 kg)                 Today's Medication Changes          These changes are accurate as of 5/30/18 11:59 PM.  If you have any questions, ask your nurse or doctor.               Start taking these medicines.        Dose/Directions    fluticasone 50 MCG/ACT spray   Commonly known as:  FLONASE   Used for:  Cough   Started by:  Shayy Linares MD        Dose:  1-2 spray   Spray 1-2 sprays into both nostrils daily   Quantity:  1 Bottle   Refills:  11       omeprazole 40 MG capsule   Commonly known as:  priLOSEC   Used for:  Cough   Started by:  Shayy Linares MD        Dose:  40 mg   Take 1 capsule (40 mg) by mouth daily Take 30-60 minutes before a meal.   Quantity:  30 capsule   Refills:  1            Where to get your medicines      These medications were sent to [id number] - [place], [place] - [address] AT [facility]  [address], [place] [address]     Phone:  [phone]     fluticasone 50 MCG/ACT spray    omeprazole 40 MG capsule                Primary Care Provider Office Phone # Fax #    Shayy Linares [phone] [address] [place] [address]        Equal Access to Services     JESÚS SZYMANSKI AH: Hadii ayesha pepper hadasho Soleah, waaxda luqadaha, qaybta kaalmada adeegmisaelda, ursula martin. So [facility] [phone].    ATENCIÓN: Si habla español, tiene a delatorre disposición servicios gratuitos de asistencia lingüística. Hood al [phone].    We comply with applicable federal civil rights laws and [place] laws. We do not discriminate on the basis of race, color, national origin, age, disability, sex, sexual orientation, or gender identity.            Thank you!     Thank you for choosing [facility]  for your care. Our goal is always to provide you with excellent care. Hearing back from our patients is one way we can continue to  improve our services. Please take a few minutes to complete the written survey that you may receive in the mail after your visit with us. Thank you!             Your Updated Medication List - Protect others around you: Learn how to safely use, store and throw away your medicines at www.disposemymeds.org.          This list is accurate as of 5/30/18 11:59 PM.  Always use your most recent med list.                   Brand Name Dispense Instructions for use Diagnosis    albuterol 108 (90 Base) MCG/ACT Inhaler    PROAIR HFA/PROVENTIL HFA/VENTOLIN HFA    1 Inhaler    Inhale 2 puffs into the lungs every 4 hours as needed for shortness of breath / dyspnea or wheezing    Cough       amitriptyline 10 MG tablet    ELAVIL    270 tablet    Take 3 tablets (30 mg) by mouth At Bedtime    Abdominal pain, right lower quadrant       benzonatate 200 MG capsule    TESSALON    30 capsule    Take 1 capsule (200 mg) by mouth 3 times daily as needed for cough    Cough       CITRUCEL PO           DAILY MULTIVITAMIN PO      Take  by mouth daily.        fluticasone 50 MCG/ACT spray    FLONASE    1 Bottle    Spray 1-2 sprays into both nostrils daily    Cough       guaiFENesin-codeine 100-10 MG/5ML Soln solution    ROBITUSSIN AC    120 mL    Take 5-10 mLs by mouth nightly as needed for cough    Cough       IBUPROFEN PO      Take 200 mg by mouth as needed        MIRALAX PO      Take by mouth as needed        omeprazole 40 MG capsule    priLOSEC    30 capsule    Take 1 capsule (40 mg) by mouth daily Take 30-60 minutes before a meal.    Cough       PREVIFEM 0.25-35 MG-MCG per tablet   Generic drug:  norgestimate-ethinyl estradiol     84 tablet    TAKE 1 TABLET DAILY    Cyst of right ovary       TYLENOL PO      Take 320 mg by mouth as needed

## 2018-05-30 NOTE — PROGRESS NOTES
HPI      ROS      Physical Exam      SUBJECTIVE:   Bee Norris is a 43 year old female who presents to clinic today for the following health issues:    RESPIRATORY SYMPTOMS      Duration: x3 weeks     Description  Cough, headache,     Severity: mild    Accompanying signs and symptoms: None    History (predisposing factors):  none    Therapies tried and outcome:  Robitussin AC outcome not effective   Started as a cough.  No other symptoms.  Cough has gotten progressively worse. Two weeks ago, had a hard time sleeping due to cough.  She was seen here in clinic one week ago.  Unfortunately she has not found any of the treatments prescribed then--albuterol, tessalon perles, and codeine cough syrup--helpful.  In fact, the cough syrup makes her jittery and unable to sleep.  She has also tried delsym which did not help      Now her coughing is more persistent, all day long instead of just morning and night.  The cough is definitely worse when talking or lying down.  Cough is dry, exept for 2 days ago, a little sputum. No hemoptysis, fever, SOB, or wheezing.  No other URI symptoms.  No allergy symptoms such as itchy/runny eyes, runny nose or sneezing.  She has not really noticed acid reflux.  She hasn't noticed postnasal drainage.  She denies any risk factors for TB.       She has had no sick contacts, other than being on plane to [place] 4 weeks ago.         Problem list and histories reviewed & adjusted, as indicated.  Additional history: as documented    Patient Active Problem List   Diagnosis     Contraceptive management     CARDIOVASCULAR SCREENING; LDL GOAL LESS THAN 160     Irritable bowel syndrome without diarrhea     Milk allergy     Chronic right-sided low back pain with right-sided sciatica     Benign neoplasm of colon     Past Surgical History:   Procedure Laterality Date     BIOPSY  2016    Colonoscapy     C APPENDECTOMY  1983     COLONOSCOPY N/A 8/9/2016    Procedure: COMBINED COLONOSCOPY, SINGLE OR  MULTIPLE BIOPSY/POLYPECTOMY BY BIOPSY;  Surgeon: Bee Paul MD;  Location: [facility] OR     COLONOSCOPY WITH CO2 INSUFFLATION N/A 8/9/2016    Procedure: COLONOSCOPY WITH CO2 INSUFFLATION;  Surgeon: Bee Paul MD;  Location: [facility] OR     HC TOOTH EXTRACTION W/FORCEP         Social History   Substance Use Topics     Smoking status: Never Smoker     Smokeless tobacco: Never Used     Alcohol use No      Comment: rarely     Family History   Problem Relation Age of Onset     Adopted: Yes     Unknown/Adopted Other      Unknown/Adopted Mother      Unknown/Adopted Father      Unknown/Adopted Maternal Grandmother      Unknown/Adopted Maternal Grandfather      Unknown/Adopted Paternal Grandmother      Unknown/Adopted Paternal Grandfather      Unknown/Adopted Brother      Unknown/Adopted Sister      Unknown/Adopted Son      Unknown/Adopted Daughter      Unknown/Adopted Other          Current Outpatient Prescriptions   Medication Sig Dispense Refill     Acetaminophen (TYLENOL PO) Take 320 mg by mouth as needed        albuterol (PROAIR HFA/PROVENTIL HFA/VENTOLIN HFA) 108 (90 Base) MCG/ACT Inhaler Inhale 2 puffs into the lungs every 4 hours as needed for shortness of breath / dyspnea or wheezing 1 Inhaler 0     amitriptyline (ELAVIL) 10 MG tablet Take 3 tablets (30 mg) by mouth At Bedtime 270 tablet 2     benzonatate (TESSALON) 200 MG capsule Take 1 capsule (200 mg) by mouth 3 times daily as needed for cough 30 capsule 0     fluticasone (FLONASE) 50 MCG/ACT spray Spray 1-2 sprays into both nostrils daily 1 Bottle 11     guaiFENesin-codeine (ROBITUSSIN AC) 100-10 MG/5ML SOLN solution Take 5-10 mLs by mouth nightly as needed for cough 120 mL 0     IBUPROFEN PO Take 200 mg by mouth as needed        Methylcellulose, Laxative, (CITRUCEL PO)        Multiple Vitamin (DAILY MULTIVITAMIN PO) Take  by mouth daily.       omeprazole (PRILOSEC) 40 MG capsule Take 1 capsule (40 mg) by mouth daily Take 30-60 minutes  before a meal. 30 capsule 1     Polyethylene Glycol 3350 (MIRALAX PO) Take by mouth as needed        PREVIFEM 0.25-35 MG-MCG per tablet TAKE 1 TABLET DAILY 84 tablet 0     Allergies   Allergen Reactions     Milk [Lac Bovis]      Sulfa Drugs      unsure of reaction, from early childhood       Reviewed and updated as needed this visit by clinical staff  Tobacco  Allergies  Meds  Med Hx  Surg Hx  Fam Hx  Soc Hx      Reviewed and updated as needed this visit by Provider         ROS:  Constitutional, HEENT, cardiovascular, pulmonary, gi and gu systems are negative, except as otherwise noted.    OBJECTIVE:     /78 (BP Location: Right arm, Patient Position: Sitting, Cuff Size: Adult Regular)  Pulse 66  Temp 98.7  F (37.1  C) (Oral)  Resp 18  Wt 125 lb (56.7 kg)  SpO2 100%  BMI 23.81 kg/m2  Body mass index is 23.81 kg/(m^2).  GENERAL APPEARANCE: healthy, alert and no distress  EYES: Eyes grossly normal to inspection, PERRL and conjunctivae and sclerae normal  HENT: ear canals and TM's normal and nose and mouth without ulcers or lesions  NECK: no adenopathy, no asymmetry, masses, or scars and thyroid normal to palpation  RESP: lungs clear to auscultation - no rales, rhonchi or wheezes  CV: regular rates and rhythm, normal S1 S2, no S3 or S4 and no murmur, click or rub  SKIN: no suspicious lesions or rashes  PSYCH: mentation appears normal and affect normal/bright    Diagnostic Test Results:  CXR--clear to my interpretation.    ASSESSMENT/PLAN:   44 yo healthy woman with no history of asthma or smoking presents with a dry cough for 3 weeks that is becoming more persistent.     1. Cough  Dry cough for three weeks without any URI, allergic or systemic symptoms.  Her exam is normal.  She does have a persistent dry cough with speaking.  Possible etiologies include GERD, post nasal drainage, post-viral cough syndrome, bronchitis, or perhaps laryngospasm.  She has not improved with the usual treatments for  bronchitis/post-viral cough syndrome, though, as we discussed, none of our options for cough treatment are usually very helpful.  If it is bronchitis, then the cough would be expected to resolve spontaneously in another 1-3 weeks.  There is no evidence for pneumonia or bacterial bronchitis, so I did not prescribe antibiotics.  Given that it is worse with talking and lying down, I suggested either monitoring with no further interventions vs having a trial of flonase and omeprazole (?silent reflux). She prefers to try the treatment.   If these treatments are not effective and the cough persists beyond another 2 weeks, I would recommend consultation with pulmonology and/or ENT.    - XR Chest 2 Views; Future  - omeprazole (PRILOSEC) 40 MG capsule; Take 1 capsule (40 mg) by mouth daily Take 30-60 minutes before a meal.  Dispense: 30 capsule; Refill: 1  - fluticasone (FLONASE) 50 MCG/ACT spray; Spray 1-2 sprays into both nostrils daily  Dispense: 1 Bottle; Refill: 11        Shayy Linares MD  [facility]

## 2018-06-06 ENCOUNTER — MYC MEDICAL ADVICE (OUTPATIENT)
Dept: FAMILY MEDICINE | Facility: CLINIC | Age: 43
End: 2018-06-06

## 2018-07-20 ENCOUNTER — MYC MEDICAL ADVICE (OUTPATIENT)
Dept: FAMILY MEDICINE | Facility: CLINIC | Age: 43
End: 2018-07-20

## 2018-07-20 ENCOUNTER — TRANSFERRED RECORDS (OUTPATIENT)
Dept: HEALTH INFORMATION MANAGEMENT | Facility: CLINIC | Age: 43
End: 2018-07-20

## 2018-07-20 DIAGNOSIS — L80 VITILIGO: Primary | ICD-10-CM

## 2018-07-23 PROBLEM — L80 VITILIGO: Status: ACTIVE | Noted: 2018-07-23

## 2018-07-24 DIAGNOSIS — L80 VITILIGO: ICD-10-CM

## 2018-07-24 PROCEDURE — 36415 COLL VENOUS BLD VENIPUNCTURE: CPT | Performed by: FAMILY MEDICINE

## 2018-07-24 PROCEDURE — 84443 ASSAY THYROID STIM HORMONE: CPT | Performed by: FAMILY MEDICINE

## 2018-07-24 PROCEDURE — 84439 ASSAY OF FREE THYROXINE: CPT | Performed by: FAMILY MEDICINE

## 2018-07-25 LAB
T4 FREE SERPL-MCNC: 1.01 NG/DL (ref 0.76–1.46)
TSH SERPL DL<=0.005 MIU/L-ACNC: 1.12 MU/L (ref 0.4–4)

## 2018-09-14 ENCOUNTER — TRANSFERRED RECORDS (OUTPATIENT)
Dept: HEALTH INFORMATION MANAGEMENT | Facility: CLINIC | Age: 43
End: 2018-09-14

## 2018-12-05 ENCOUNTER — TRANSFERRED RECORDS (OUTPATIENT)
Dept: HEALTH INFORMATION MANAGEMENT | Facility: CLINIC | Age: 43
End: 2018-12-05

## 2019-02-14 ENCOUNTER — OFFICE VISIT (OUTPATIENT)
Dept: FAMILY MEDICINE | Facility: CLINIC | Age: 44
End: 2019-02-14
Payer: COMMERCIAL

## 2019-02-14 VITALS
HEIGHT: 61 IN | OXYGEN SATURATION: 97 % | TEMPERATURE: 98.3 F | DIASTOLIC BLOOD PRESSURE: 65 MMHG | BODY MASS INDEX: 23.22 KG/M2 | WEIGHT: 123 LBS | SYSTOLIC BLOOD PRESSURE: 107 MMHG | HEART RATE: 70 BPM | RESPIRATION RATE: 12 BRPM

## 2019-02-14 DIAGNOSIS — N83.201 CYST OF RIGHT OVARY: ICD-10-CM

## 2019-02-14 DIAGNOSIS — Z00.00 ROUTINE GENERAL MEDICAL EXAMINATION AT A HEALTH CARE FACILITY: Primary | ICD-10-CM

## 2019-02-14 PROCEDURE — 99396 PREV VISIT EST AGE 40-64: CPT | Performed by: NURSE PRACTITIONER

## 2019-02-14 RX ORDER — NORGESTIMATE AND ETHINYL ESTRADIOL 0.25-0.035
1 KIT ORAL DAILY
Qty: 84 TABLET | Status: SHIPPED | OUTPATIENT
Start: 2019-02-14 | End: 2020-04-09

## 2019-02-14 ASSESSMENT — ENCOUNTER SYMPTOMS
HEMATOCHEZIA: 0
HEMATURIA: 0
CHILLS: 0
EYE PAIN: 0
BREAST MASS: 0
SORE THROAT: 0
DIARRHEA: 0
DYSURIA: 0
PALPITATIONS: 0
PARESTHESIAS: 0
COUGH: 0
WEAKNESS: 0
SHORTNESS OF BREATH: 0
DIZZINESS: 0
MYALGIAS: 0
JOINT SWELLING: 0
CONSTIPATION: 0
ABDOMINAL PAIN: 0
NAUSEA: 0

## 2019-02-14 ASSESSMENT — MIFFLIN-ST. JEOR: SCORE: 1146.33

## 2019-02-14 NOTE — PROGRESS NOTES
SUBJECTIVE:   CC: Bee Norris is an 43 year old woman who presents for preventive health visit.     Physical   Annual:     Getting at least 3 servings of Calcium per day:  Yes    Bi-annual eye exam:  Yes    Dental care twice a year:  Yes    Sleep apnea or symptoms of sleep apnea:  None    Diet:  Other    Frequency of exercise:  4-5 days/week    Duration of exercise:  45-60 minutes    Taking medications regularly:  Yes    Medication side effects:  None    Additional concerns today:  No    PHQ-2 Total Score: 0              Today's PHQ-2 Score:   PHQ-2 ( 1999 Pfizer) 2/14/2019   Q1: Little interest or pleasure in doing things 0   Q2: Feeling down, depressed or hopeless 0   PHQ-2 Score 0   Q1: Little interest or pleasure in doing things Not at all   Q2: Feeling down, depressed or hopeless Not at all   PHQ-2 Score 0       Abuse: Current or Past(Physical, Sexual or Emotional)- NO  Do you feel safe in your environment? Yes    Social History     Tobacco Use     Smoking status: Never Smoker     Smokeless tobacco: Never Used   Substance Use Topics     Alcohol use: No     Comment: rarely     Alcohol Use 2/14/2019   If you drink alcohol do you typically have greater than 3 drinks per day OR greater than 7 drinks per week? No       Reviewed orders with patient.  Reviewed health maintenance and updated orders accordingly - Yes          Pertinent mammograms are reviewed under the imaging tab.  History of abnormal Pap smear: NO - age 30- 65 PAP every 3 years recommended  PAP / HPV Latest Ref Rng & Units 5/22/2017 10/10/2013 11/8/2012   PAP - NIL NIL LSIL(A)   HPV 16 DNA NEG Negative - -   HPV 18 DNA NEG Negative - -   OTHER HR HPV NEG Negative - -     Reviewed and updated as needed this visit by clinical staff  Tobacco  Allergies  Meds  Med Hx  Surg Hx  Fam Hx  Soc Hx        Reviewed and updated as needed this visit by Provider            Review of Systems   Constitutional: Negative for chills.   HENT: Positive for  "congestion. Negative for ear pain and sore throat.    Eyes: Negative for pain and visual disturbance.   Respiratory: Negative for cough and shortness of breath.    Cardiovascular: Negative for chest pain, palpitations and peripheral edema.   Gastrointestinal: Negative for abdominal pain, constipation, diarrhea, hematochezia and nausea.   Breasts:  Negative for tenderness, breast mass and discharge.   Genitourinary: Negative for dysuria, hematuria, pelvic pain, urgency and vaginal bleeding.   Musculoskeletal: Negative for joint swelling and myalgias.   Skin: Negative for rash.   Neurological: Negative for dizziness, weakness and paresthesias.          OBJECTIVE:   /65   Pulse 70   Temp 98.3  F (36.8  C) (Oral)   Resp 12   Ht 1.543 m (5' 0.75\")   Wt 55.8 kg (123 lb)   LMP 01/07/2019   SpO2 97%   Breastfeeding? No   BMI 23.43 kg/m    Physical Exam  GENERAL: healthy, alert and no distress  EYES: Eyes grossly normal to inspection, PERRL and conjunctivae and sclerae normal  HENT: ear canals and TM's normal, nose and mouth without ulcers or lesions  NECK: no adenopathy, no asymmetry, masses, or scars and thyroid normal to palpation  RESP: lungs clear to auscultation - no rales, rhonchi or wheezes  BREAST: normal without masses, tenderness or nipple discharge and no palpable axillary masses or adenopathy  CV: regular rate and rhythm, normal S1 S2, no S3 or S4, no murmur, click or rub, no peripheral edema and peripheral pulses strong  ABDOMEN: soft, nontender, no hepatosplenomegaly, no masses and bowel sounds normal  MS: no gross musculoskeletal defects noted, no edema  SKIN: no suspicious lesions or rashes  NEURO: Normal strength and tone, mentation intact and speech normal  PSYCH: mentation appears normal, affect normal/bright        ASSESSMENT/PLAN:   1. Routine general medical examination at a health care facility      2. Cyst of right ovary  Refills given.   - norgestimate-ethinyl estradiol (ESTARYLLA) " "0.25-35 MG-MCG tablet; Take 1 tablet by mouth daily  Dispense: 84 tablet; Refill: PRN    COUNSELING:  Reviewed preventive health counseling, as reflected in patient instructions    BP Readings from Last 1 Encounters:   02/14/19 107/65     Estimated body mass index is 23.43 kg/m  as calculated from the following:    Height as of this encounter: 1.543 m (5' 0.75\").    Weight as of this encounter: 55.8 kg (123 lb).           reports that  has never smoked. she has never used smokeless tobacco.      Counseling Resources:  ATP IV Guidelines  Pooled Cohorts Equation Calculator  Breast Cancer Risk Calculator  FRAX Risk Assessment  ICSI Preventive Guidelines  Dietary Guidelines for Americans, 2010  USDA's MyPlate  ASA Prophylaxis  Lung CA Screening    Corrine Quintanilla NP  [facility]  "

## 2019-10-05 ENCOUNTER — HEALTH MAINTENANCE LETTER (OUTPATIENT)
Age: 44
End: 2019-10-05

## 2020-04-08 DIAGNOSIS — N83.201 CYST OF RIGHT OVARY: ICD-10-CM

## 2020-04-09 RX ORDER — NORGESTIMATE AND ETHINYL ESTRADIOL 0.25-0.035
KIT ORAL
Qty: 84 TABLET | Refills: 0 | Status: SHIPPED | OUTPATIENT
Start: 2020-04-09 | End: 2021-06-04

## 2020-04-09 NOTE — TELEPHONE ENCOUNTER
Last Office Visit: 2/14/2019   Future Office Visit:  None    Prescription approved per G Refill Protocol.  Corrie Lopez RN

## 2020-11-14 ENCOUNTER — HEALTH MAINTENANCE LETTER (OUTPATIENT)
Age: 45
End: 2020-11-14

## 2020-12-08 ENCOUNTER — OFFICE VISIT (OUTPATIENT)
Dept: FAMILY MEDICINE | Facility: CLINIC | Age: 45
End: 2020-12-08
Payer: COMMERCIAL

## 2020-12-08 VITALS
DIASTOLIC BLOOD PRESSURE: 76 MMHG | OXYGEN SATURATION: 95 % | WEIGHT: 115 LBS | TEMPERATURE: 98.9 F | SYSTOLIC BLOOD PRESSURE: 114 MMHG | HEART RATE: 64 BPM | BODY MASS INDEX: 21.71 KG/M2 | HEIGHT: 61 IN

## 2020-12-08 DIAGNOSIS — Z00.00 ROUTINE GENERAL MEDICAL EXAMINATION AT A HEALTH CARE FACILITY: Primary | ICD-10-CM

## 2020-12-08 DIAGNOSIS — Z23 NEED FOR PROPHYLACTIC VACCINATION AND INOCULATION AGAINST INFLUENZA: ICD-10-CM

## 2020-12-08 PROCEDURE — 36415 COLL VENOUS BLD VENIPUNCTURE: CPT | Performed by: NURSE PRACTITIONER

## 2020-12-08 PROCEDURE — 90686 IIV4 VACC NO PRSV 0.5 ML IM: CPT | Performed by: NURSE PRACTITIONER

## 2020-12-08 PROCEDURE — G0145 SCR C/V CYTO,THINLAYER,RESCR: HCPCS | Performed by: NURSE PRACTITIONER

## 2020-12-08 PROCEDURE — 99396 PREV VISIT EST AGE 40-64: CPT | Mod: 25 | Performed by: NURSE PRACTITIONER

## 2020-12-08 PROCEDURE — 87624 HPV HI-RISK TYP POOLED RSLT: CPT | Performed by: NURSE PRACTITIONER

## 2020-12-08 PROCEDURE — 86803 HEPATITIS C AB TEST: CPT | Performed by: NURSE PRACTITIONER

## 2020-12-08 PROCEDURE — 82947 ASSAY GLUCOSE BLOOD QUANT: CPT | Performed by: NURSE PRACTITIONER

## 2020-12-08 PROCEDURE — 90471 IMMUNIZATION ADMIN: CPT | Performed by: NURSE PRACTITIONER

## 2020-12-08 PROCEDURE — 80061 LIPID PANEL: CPT | Performed by: NURSE PRACTITIONER

## 2020-12-08 ASSESSMENT — ENCOUNTER SYMPTOMS
CHILLS: 0
JOINT SWELLING: 0
PALPITATIONS: 0
BREAST MASS: 0
MYALGIAS: 0
HEARTBURN: 0
DIZZINESS: 0
NERVOUS/ANXIOUS: 0
DIARRHEA: 0
ARTHRALGIAS: 0
COUGH: 0
FEVER: 0
SORE THROAT: 0
FREQUENCY: 0
NAUSEA: 0
EYE PAIN: 0
ABDOMINAL PAIN: 0
CONSTIPATION: 0
HEMATURIA: 0
WEAKNESS: 0
PARESTHESIAS: 0
HEADACHES: 0
SHORTNESS OF BREATH: 0
DYSURIA: 0
HEMATOCHEZIA: 0

## 2020-12-08 ASSESSMENT — MIFFLIN-ST. JEOR: SCORE: 1096.08

## 2020-12-08 NOTE — PROGRESS NOTES
SUBJECTIVE:   CC: Bee Norris is an 45 year old woman who presents for preventive health visit.       Patient has been advised of split billing requirements and indicates understanding:   Healthy Habits:     Getting at least 3 servings of Calcium per day:  Yes    Bi-annual eye exam:  Yes    Dental care twice a year:  Yes    Sleep apnea or symptoms of sleep apnea:  None    Diet:  Other    Frequency of exercise:  4-5 days/week    Duration of exercise:  30-45 minutes    Taking medications regularly:  Yes    Medication side effects:  Not applicable    PHQ-2 Total Score: 0    Additional concerns today:  No              Today's PHQ-2 Score:   PHQ-2 ( 1999 Pfizer) 12/8/2020   Q1: Little interest or pleasure in doing things 0   Q2: Feeling down, depressed or hopeless 0   PHQ-2 Score 0   Q1: Little interest or pleasure in doing things Not at all   Q2: Feeling down, depressed or hopeless Not at all   PHQ-2 Score 0       Abuse: Current or Past (Physical, Sexual or Emotional) - No  Do you feel safe in your environment? Yes        Social History     Tobacco Use     Smoking status: Never Smoker     Smokeless tobacco: Never Used   Substance Use Topics     Alcohol use: No     Comment: rarely         Alcohol Use 12/8/2020   Prescreen: >3 drinks/day or >7 drinks/week? Not Applicable   Prescreen: >3 drinks/day or >7 drinks/week? -       Reviewed orders with patient.  Reviewed health maintenance and updated orders accordingly - Yes          Pertinent mammograms are reviewed under the imaging tab.  History of abnormal Pap smear: YES - updated in Problem List and Health Maintenance accordingly  PAP / HPV Latest Ref Rng & Units 5/22/2017 10/10/2013 11/8/2012   PAP - NIL NIL LSIL(A)   HPV 16 DNA NEG Negative - -   HPV 18 DNA NEG Negative - -   OTHER HR HPV NEG Negative - -     Reviewed and updated as needed this visit by clinical staff  Tobacco     Med Hx  Surg Hx  Fam Hx  Soc Hx        Reviewed and updated as needed this visit  "by Provider                    Review of Systems   Constitutional: Negative for chills and fever.   HENT: Negative for congestion, ear pain, hearing loss and sore throat.    Eyes: Negative for pain and visual disturbance.   Respiratory: Negative for cough and shortness of breath.    Cardiovascular: Negative for chest pain, palpitations and peripheral edema.   Gastrointestinal: Negative for abdominal pain, constipation, diarrhea, heartburn, hematochezia and nausea.   Breasts:  Negative for tenderness, breast mass and discharge.   Genitourinary: Negative for dysuria, frequency, genital sores, hematuria, pelvic pain, urgency, vaginal bleeding and vaginal discharge.   Musculoskeletal: Negative for arthralgias, joint swelling and myalgias.   Skin: Negative for rash.   Neurological: Negative for dizziness, weakness, headaches and paresthesias.   Psychiatric/Behavioral: Negative for mood changes. The patient is not nervous/anxious.           OBJECTIVE:   /76   Pulse 64   Temp 98.9  F (37.2  C) (Tympanic)   Ht 1.537 m (5' 0.5\")   Wt 52.2 kg (115 lb)   LMP 11/08/2020 (Approximate)   SpO2 95%   BMI 22.09 kg/m    Physical Exam  GENERAL: healthy, alert and no distress  EYES: Eyes grossly normal to inspection, PERRL and conjunctivae and sclerae normal  HENT: ear canals and TM's normal, nose and mouth without ulcers or lesions  NECK: no adenopathy, no asymmetry, masses, or scars and thyroid normal to palpation  RESP: lungs clear to auscultation - no rales, rhonchi or wheezes  BREAST: normal without masses, tenderness or nipple discharge and no palpable axillary masses or adenopathy  CV: regular rate and rhythm, normal S1 S2, no S3 or S4, no murmur, click or rub, no peripheral edema and peripheral pulses strong  ABDOMEN: soft, nontender, no hepatosplenomegaly, no masses and bowel sounds normal   (female): normal female external genitalia, normal urethral meatus, vaginal mucosa pink, moist, well rugated, and normal " "cervix/adnexa/uterus without masses or discharge  MS: no gross musculoskeletal defects noted, no edema  SKIN: no suspicious lesions or rashes  NEURO: Normal strength and tone, mentation intact and speech normal  PSYCH: mentation appears normal, affect normal/bright        ASSESSMENT/PLAN:   (Z00.00) Routine general medical examination at a health care facility  (primary encounter diagnosis)  Comment:   Plan: GLUCOSE, Lipid panel reflex to direct LDL         Non-fasting, Pap imaged thin layer screen with         HPV - recommended age 30 - 65 years (select HPV        order below), HPV High Risk Types DNA Cervical,        Hepatitis C Screen Reflex to HCV RNA Quant and         Genotype            (Z23) Need for prophylactic vaccination and inoculation against influenza  Comment:   Plan: INFLUENZA VACCINE IM > 6 MONTHS VALENT IIV4         [64159]              Patient has been advised of split billing requirements and indicates understanding:   COUNSELING:  Reviewed preventive health counseling, as reflected in patient instructions    Estimated body mass index is 22.09 kg/m  as calculated from the following:    Height as of this encounter: 1.537 m (5' 0.5\").    Weight as of this encounter: 52.2 kg (115 lb).        She reports that she has never smoked. She has never used smokeless tobacco.      Counseling Resources:  ATP IV Guidelines  Pooled Cohorts Equation Calculator  Breast Cancer Risk Calculator  BRCA-Related Cancer Risk Assessment: FHS-7 Tool  FRAX Risk Assessment  ICSI Preventive Guidelines  Dietary Guidelines for Americans, 2010  USDA's MyPlate  ASA Prophylaxis  Lung CA Screening    Corrine Quintanilla NP  [facility]  "

## 2020-12-09 LAB
CHOLEST SERPL-MCNC: 207 MG/DL
GLUCOSE SERPL-MCNC: 95 MG/DL (ref 70–99)
HCV AB SERPL QL IA: NONREACTIVE
HDLC SERPL-MCNC: 83 MG/DL
LDLC SERPL CALC-MCNC: 106 MG/DL
NONHDLC SERPL-MCNC: 124 MG/DL
TRIGL SERPL-MCNC: 90 MG/DL

## 2020-12-10 ENCOUNTER — TELEPHONE (OUTPATIENT)
Dept: FAMILY MEDICINE | Facility: CLINIC | Age: 45
End: 2020-12-10

## 2020-12-11 LAB
COPATH REPORT: NORMAL
PAP: NORMAL

## 2020-12-15 LAB
FINAL DIAGNOSIS: NORMAL
HPV HR 12 DNA CVX QL NAA+PROBE: NEGATIVE
HPV16 DNA SPEC QL NAA+PROBE: NEGATIVE
HPV18 DNA SPEC QL NAA+PROBE: NEGATIVE
SPECIMEN DESCRIPTION: NORMAL
SPECIMEN SOURCE CVX/VAG CYTO: NORMAL

## 2021-02-06 ENCOUNTER — HEALTH MAINTENANCE LETTER (OUTPATIENT)
Age: 46
End: 2021-02-06

## 2021-06-04 DIAGNOSIS — N83.201 CYST OF RIGHT OVARY: ICD-10-CM

## 2021-06-04 RX ORDER — NORGESTIMATE AND ETHINYL ESTRADIOL 0.25-0.035
1 KIT ORAL DAILY
Qty: 84 TABLET | Refills: 1 | Status: SHIPPED | OUTPATIENT
Start: 2021-06-04 | End: 2021-11-19

## 2021-07-14 ENCOUNTER — E-VISIT (OUTPATIENT)
Dept: FAMILY MEDICINE | Facility: CLINIC | Age: 46
End: 2021-07-14

## 2021-07-14 ENCOUNTER — MYC MEDICAL ADVICE (OUTPATIENT)
Dept: FAMILY MEDICINE | Facility: CLINIC | Age: 46
End: 2021-07-14

## 2021-07-14 DIAGNOSIS — G43.909 MIGRAINE WITHOUT STATUS MIGRAINOSUS, NOT INTRACTABLE, UNSPECIFIED MIGRAINE TYPE: Primary | ICD-10-CM

## 2021-07-14 PROCEDURE — 99421 OL DIG E/M SVC 5-10 MIN: CPT | Performed by: NURSE PRACTITIONER

## 2021-07-14 RX ORDER — RIZATRIPTAN BENZOATE 10 MG/1
10 TABLET ORAL
Qty: 9 TABLET | Refills: 0 | Status: SHIPPED | OUTPATIENT
Start: 2021-07-14 | End: 2024-06-12

## 2021-07-15 NOTE — TELEPHONE ENCOUNTER
Writer responded via [employer].    SYLVIA CejaN, RN  [facility]

## 2021-07-15 NOTE — TELEPHONE ENCOUNTER
Noted.    No further action needed from triage.  SYLVIA CejaN, RN  [facility]

## 2021-09-12 ENCOUNTER — HEALTH MAINTENANCE LETTER (OUTPATIENT)
Age: 46
End: 2021-09-12

## 2021-11-17 DIAGNOSIS — N83.201 CYST OF RIGHT OVARY: ICD-10-CM

## 2021-11-19 RX ORDER — NORGESTIMATE AND ETHINYL ESTRADIOL 0.25-0.035
KIT ORAL
Qty: 84 TABLET | Refills: 0 | Status: SHIPPED | OUTPATIENT
Start: 2021-11-19 | End: 2022-02-10

## 2021-11-19 NOTE — TELEPHONE ENCOUNTER
Medication is being filled for 1 time refill only due to:  Patient needs to be seen because it has almost been 1 year since last seen.     Team Coordinators: Please contact patient to set up annual physical for any further refills.     TABITHA Cevallos RN  [facility]

## 2022-01-04 ENCOUNTER — TELEPHONE (OUTPATIENT)
Dept: FAMILY MEDICINE | Facility: CLINIC | Age: 47
End: 2022-01-04
Payer: COMMERCIAL

## 2022-01-04 NOTE — TELEPHONE ENCOUNTER
Patient noticed a rash about a week ago.  Does not seem to itch, but does seem to be spreading.  Patiet was given an appointment for Thursday 1/6/21 for office visit to evaluate.  Cele Kulkarni RN  [facility]

## 2022-01-06 ENCOUNTER — OFFICE VISIT (OUTPATIENT)
Dept: FAMILY MEDICINE | Facility: CLINIC | Age: 47
End: 2022-01-06
Payer: COMMERCIAL

## 2022-01-06 VITALS
DIASTOLIC BLOOD PRESSURE: 71 MMHG | SYSTOLIC BLOOD PRESSURE: 111 MMHG | OXYGEN SATURATION: 98 % | HEART RATE: 66 BPM | BODY MASS INDEX: 23.53 KG/M2 | WEIGHT: 124.6 LBS | HEIGHT: 61 IN | TEMPERATURE: 97.6 F

## 2022-01-06 DIAGNOSIS — L73.9 FOLLICULITIS: Primary | ICD-10-CM

## 2022-01-06 DIAGNOSIS — Z12.31 ENCOUNTER FOR SCREENING MAMMOGRAM FOR BREAST CANCER: ICD-10-CM

## 2022-01-06 PROCEDURE — 99213 OFFICE O/P EST LOW 20 MIN: CPT | Performed by: STUDENT IN AN ORGANIZED HEALTH CARE EDUCATION/TRAINING PROGRAM

## 2022-01-06 RX ORDER — MUPIROCIN 20 MG/G
OINTMENT TOPICAL 3 TIMES DAILY
Qty: 15 G | Refills: 0 | Status: SHIPPED | OUTPATIENT
Start: 2022-01-06 | End: 2022-01-13

## 2022-01-06 ASSESSMENT — MIFFLIN-ST. JEOR: SCORE: 1142.56

## 2022-01-06 NOTE — PATIENT INSTRUCTIONS
Patient Education     Folliculitis  Folliculitis is an infection of a hair follicle. A hair follicle is the little pocket where a hair grows out of the skin. Bacteria normally live on the skin. But sometimes bacteria can get trapped in a follicle and cause infection. This causes a bumpy rash. The area over the follicles is red and raised. It may itch or be painful. The bumps may have fluid (pus) inside. The pus may leak and then form crusts. Sores can spread to other areas of the body. Once it goes away, folliculitis can come back at any time. Severe cases may cause lasting (permanent) hair loss and scarring.   Folliculitis can happen anywhere on the body where hair grows. It can be caused by rubbing from tight clothing. Ingrown hairs can cause it. Soaking in a hot tub or swimming pool that has bacteria in the water can cause it. It may also occur if a hair follicle is blocked by a bandage.   Sores often go away in a few days with no treatment. In some cases, medicine may be given. A small piece of skin or pus may be taken to find the type of bacteria causing the infection.   Home care  The healthcare provider may prescribe an antibiotic cream or ointment. Antibiotics taken by mouth (oral) may also be prescribed. Or you may be told to use an over-the-counter antibiotic cream. Follow all instructions when using any of these medicines.   General care    Apply warm, moist compresses to the sores for 20 minutes up to 3 times a day. You can make a compress by soaking a cloth in warm water. Squeeze out excess water.    Don t cut, poke, or squeeze the sores. This can be painful and spread infection.    Don t scratch the affected area. Scratching can delay healing.    Don t shave the areas affected by folliculitis.    If the sores leak fluid, cover the area with a nonstick gauze bandage. Use as little tape as possible. Carefully get rid of all soiled bandages.    Dress in loose cotton clothing.    Change sheets and blankets  if they are soiled by pus. Wash all clothes, towels, sheets, and cloth diapers in soap and hot water. Don't share clothes, towels, or sheets with other family members.    Don't soak the sores in bath water. This can spread infection. Instead keep the area clean by gently washing sores with soap and warm water.    Wash your hands or use antibacterial gels often to prevent spreading the bacteria.    Follow-up care  Follow up with your healthcare provider, or as advised.  When to get medical advice  Call your healthcare provider right away if any of these occur:    Fever of 100.4 F (38 C) or higher, or as directed by your provider    Rash spreads    Rash does not get better with treatment    Redness or swelling that gets worse    Rash becomes more painful    Foul-smelling fluid leaking from the skin    Rash improves, but then comes back   Nargis last reviewed this educational content on 8/1/2019 2000-2021 [employer]. All rights reserved. This information is not intended as a substitute for professional medical care. Always follow your healthcare professional's instructions.

## 2022-01-06 NOTE — PROGRESS NOTES
"  Assessment & Plan     Folliculitis  Rash appears consistent with folliculitis. No obvious triggering factor, however perhaps being on antibiotics changed the skin judy such that she was predisposed to developing rash. Will treat with topical antibiotics and she was instructed to follow up if lack of improvement or worsening.   - mupirocin (BACTROBAN) 2 % external ointment  Dispense: 15 g; Refill: 0    Encounter for screening mammogram for breast cancer  She is due for mammography.   - *MA Screening Digital Bilateral      Maura Davison MD  [facility]    Corona Gamboa is a 46 year old who presents for the following health issues     HPI     PMH of chronic LBS, IBS, vitiligo, migraine, on OCP.    Noted rash on inner left elbow on on Saturday  On Sunday, she noted rash on her right arm. Then spread to her chest and stomach.  On Tuesday, she noted rash on her thighs.  Yesterday, the rash seemed to impove but then she noted rash on her left buttock.   The rash will come up and then self resolve within a few days.  It is not pruritic.  No prior h/o similar rash.  No household contacts. She has a dog.  She used a new body wash on 1/26.  She was on an oral antibiotic for 7 days endng on the 27th. Thinks it might have been amoxicillin. Rash started after finishing the course.  Denies new lotion, clothing, laundry detergent, dish soap, linens.   No fever, chills, recent URI symptoms, joint pains, urinary changes.         Objective    /71 (BP Location: Right arm, Patient Position: Chair, Cuff Size: Adult Regular)   Pulse 66   Temp 97.6  F (36.4  C) (Temporal)   Ht 1.549 m (5' 1\")   Wt 56.5 kg (124 lb 9.6 oz)   SpO2 98%   BMI 23.54 kg/m    Body mass index is 23.54 kg/m .  Physical Exam   GENERAL: healthy, alert and no distress  EYES: Eyes grossly normal to inspection, PERRL and conjunctivae and sclerae normal  MS: no gross musculoskeletal defects noted, no edema  SKIN: " Scattered rash primarily present on left buttock but also with small amount of residual rash on left wrist and right arm consisting of several scattered erythematous papules

## 2022-02-03 ENCOUNTER — ANCILLARY PROCEDURE (OUTPATIENT)
Dept: MAMMOGRAPHY | Facility: CLINIC | Age: 47
End: 2022-02-03
Attending: STUDENT IN AN ORGANIZED HEALTH CARE EDUCATION/TRAINING PROGRAM
Payer: COMMERCIAL

## 2022-02-03 DIAGNOSIS — Z12.31 ENCOUNTER FOR SCREENING MAMMOGRAM FOR BREAST CANCER: ICD-10-CM

## 2022-02-03 PROCEDURE — 77063 BREAST TOMOSYNTHESIS BI: CPT | Mod: TC | Performed by: RADIOLOGY

## 2022-02-03 PROCEDURE — 77067 SCR MAMMO BI INCL CAD: CPT | Mod: TC | Performed by: RADIOLOGY

## 2022-02-10 DIAGNOSIS — N83.201 CYST OF RIGHT OVARY: ICD-10-CM

## 2022-02-10 RX ORDER — NORGESTIMATE AND ETHINYL ESTRADIOL 0.25-0.035
KIT ORAL
Qty: 84 TABLET | Refills: 0 | Status: SHIPPED | OUTPATIENT
Start: 2022-02-10 | End: 2022-03-01

## 2022-02-10 NOTE — TELEPHONE ENCOUNTER
Medication is being filled for 1 time refill only due to:  Patient needs to be seen because per last refill.    Future Office Visit:   Next 5 appointments (look out 90 days)    Mar 01, 2022  4:00 PM  (Arrive by 3:40 PM)  Adult Preventative Visit with Corrine Quintanilla NP  [facility] ([facility] ) [address] [place] [address]  [phone]           Maryan Rebollar RN  [facility]

## 2022-02-27 ENCOUNTER — HEALTH MAINTENANCE LETTER (OUTPATIENT)
Age: 47
End: 2022-02-27

## 2022-03-01 ENCOUNTER — OFFICE VISIT (OUTPATIENT)
Dept: FAMILY MEDICINE | Facility: CLINIC | Age: 47
End: 2022-03-01
Payer: COMMERCIAL

## 2022-03-01 VITALS
WEIGHT: 124 LBS | SYSTOLIC BLOOD PRESSURE: 122 MMHG | TEMPERATURE: 98.8 F | RESPIRATION RATE: 16 BRPM | HEIGHT: 61 IN | BODY MASS INDEX: 23.41 KG/M2 | DIASTOLIC BLOOD PRESSURE: 74 MMHG | OXYGEN SATURATION: 98 % | HEART RATE: 70 BPM

## 2022-03-01 DIAGNOSIS — Z00.00 ROUTINE GENERAL MEDICAL EXAMINATION AT A HEALTH CARE FACILITY: Primary | ICD-10-CM

## 2022-03-01 DIAGNOSIS — N83.201 CYST OF RIGHT OVARY: ICD-10-CM

## 2022-03-01 PROCEDURE — 90471 IMMUNIZATION ADMIN: CPT | Performed by: NURSE PRACTITIONER

## 2022-03-01 PROCEDURE — 99396 PREV VISIT EST AGE 40-64: CPT | Mod: 25 | Performed by: NURSE PRACTITIONER

## 2022-03-01 PROCEDURE — 90715 TDAP VACCINE 7 YRS/> IM: CPT | Performed by: NURSE PRACTITIONER

## 2022-03-01 RX ORDER — NORGESTIMATE AND ETHINYL ESTRADIOL 0.25-0.035
KIT ORAL
Qty: 84 TABLET | Refills: 3 | Status: SHIPPED | OUTPATIENT
Start: 2022-03-01 | End: 2023-03-17

## 2022-03-01 ASSESSMENT — ENCOUNTER SYMPTOMS
EYE PAIN: 0
HEARTBURN: 0
NAUSEA: 0
COUGH: 0
DIZZINESS: 0
BREAST MASS: 0
ARTHRALGIAS: 0
CONSTIPATION: 0
DIARRHEA: 0
SORE THROAT: 0
HEMATOCHEZIA: 0
JOINT SWELLING: 0
WEAKNESS: 0
FEVER: 0
CHILLS: 0
NERVOUS/ANXIOUS: 0
FREQUENCY: 0
PARESTHESIAS: 0
SHORTNESS OF BREATH: 0
MYALGIAS: 0
HEMATURIA: 0
PALPITATIONS: 0
HEADACHES: 0
ABDOMINAL PAIN: 0
DYSURIA: 0

## 2022-03-01 NOTE — PROGRESS NOTES
SUBJECTIVE:   CC: Bee Norris is an 46 year old woman who presents for preventive health visit.   Patient has been advised of split billing requirements and indicates understanding: Yes  Healthy Habits:     Getting at least 3 servings of Calcium per day:  Yes    Bi-annual eye exam:  Yes    Dental care twice a year:  Yes    Sleep apnea or symptoms of sleep apnea:  None    Diet:  Regular (no restrictions)    Frequency of exercise:  2-3 days/week    Duration of exercise:  45-60 minutes    Taking medications regularly:  Yes    Medication side effects:  Not applicable and None    PHQ-2 Total Score: 0    Additional concerns today:  No    Today's PHQ-2 Score:   PHQ-2 ( 1999 Pfizer) 3/1/2022   Q1: Little interest or pleasure in doing things 0   Q2: Feeling down, depressed or hopeless 0   PHQ-2 Score 0   PHQ-2 Total Score (12-17 Years)- Positive if 3 or more points; Administer PHQ-A if positive -   Q1: Little interest or pleasure in doing things Not at all   Q2: Feeling down, depressed or hopeless Not at all   PHQ-2 Score 0       Abuse: Current or Past (Physical, Sexual or Emotional) - No  Do you feel safe in your environment? Yes    Have you ever done Advance Care Planning? (For example, a Health Directive, POLST, or a discussion with a medical provider or your loved ones about your wishes): No, advance care planning information given to patient to review.  Patient declined advance care planning discussion at this time.    Social History     Tobacco Use     Smoking status: Never Smoker     Smokeless tobacco: Never Used   Substance Use Topics     Alcohol use: No     Comment: rarely     If you drink alcohol do you typically have >3 drinks per day or >7 drinks per week? No    Alcohol Use 3/1/2022   Prescreen: >3 drinks/day or >7 drinks/week? Not Applicable   Prescreen: >3 drinks/day or >7 drinks/week? -       Reviewed orders with patient.  Reviewed health maintenance and updated orders accordingly - Yes      Breast  Cancer Screening:  Any new diagnosis of family breast, ovarian, or bowel cancer?     FHS-7:   Breast CA Risk Assessment (FHS-7) 2/3/2022   Did any of your first-degree relatives have breast or ovarian cancer? Unknown   Did any of your relatives have bilateral breast cancer? Unknown   Did any man in your family have breast cancer? Unknown   Did any woman in your family have breast and ovarian cancer? Unknown   Did any woman in your family have breast cancer before age 50 y? Unknown   Do you have 2 or more relatives with breast and/or ovarian cancer? Unknown   Do you have 2 or more relatives with breast and/or bowel cancer? Unknown         Pertinent mammograms are reviewed under the imaging tab.    History of abnormal Pap smear: NO - age 30-65 PAP every 5 years with negative HPV co-testing recommended  PAP / HPV Latest Ref Rng & Units 12/8/2020 5/22/2017 10/10/2013   PAP (Historical) - NIL NIL NIL   HPV16 NEG:Negative Negative Negative -   HPV18 NEG:Negative Negative Negative -   HRHPV NEG:Negative Negative Negative -     Reviewed and updated as needed this visit by clinical staff   Tobacco  Allergies  Meds  Problems  Med Hx  Surg Hx  Fam Hx  Soc   Hx        Reviewed and updated as needed this visit by Provider   Tobacco  Allergies  Meds  Problems  Med Hx  Surg Hx  Fam Hx             Review of Systems   Constitutional: Negative for chills and fever.   HENT: Negative for congestion, ear pain, hearing loss and sore throat.    Eyes: Negative for pain and visual disturbance.   Respiratory: Negative for cough and shortness of breath.    Cardiovascular: Negative for chest pain, palpitations and peripheral edema.   Gastrointestinal: Negative for abdominal pain, constipation, diarrhea, heartburn, hematochezia and nausea.   Breasts:  Negative for tenderness, breast mass and discharge.   Genitourinary: Negative for dysuria, frequency, genital sores, hematuria, pelvic pain, urgency, vaginal bleeding and vaginal  "discharge.   Musculoskeletal: Negative for arthralgias, joint swelling and myalgias.   Skin: Negative for rash.   Neurological: Negative for dizziness, weakness, headaches and paresthesias.   Psychiatric/Behavioral: Negative for mood changes. The patient is not nervous/anxious.           OBJECTIVE:   /74   Pulse 70   Temp 98.8  F (37.1  C) (Tympanic)   Resp 16   Ht 1.537 m (5' 0.5\")   Wt 56.2 kg (124 lb)   LMP 02/03/2022 (Approximate)   SpO2 98%   Breastfeeding No   BMI 23.82 kg/m    Physical Exam  GENERAL: healthy, alert and no distress  EYES: Eyes grossly normal to inspection, PERRL and conjunctivae and sclerae normal  HENT: ear canals and TM's normal, nose and mouth without ulcers or lesions  NECK: no adenopathy, no asymmetry, masses, or scars and thyroid normal to palpation  RESP: lungs clear to auscultation - no rales, rhonchi or wheezes  CV: regular rate and rhythm, normal S1 S2, no S3 or S4, no murmur, click or rub, no peripheral edema and peripheral pulses strong  ABDOMEN: soft, nontender, no hepatosplenomegaly, no masses and bowel sounds normal  MS: no gross musculoskeletal defects noted, no edema  SKIN: no suspicious lesions or rashes  NEURO: Normal strength and tone, mentation intact and speech normal  PSYCH: mentation appears normal, affect normal/bright        ASSESSMENT/PLAN:   (Z00.00) Routine general medical examination at a health care facility  (primary encounter diagnosis)  Comment:   Plan:     (N83.201) Cyst of right ovary  Comment:   Plan: norgestimate-ethinyl estradiol (ESTARYLLA)         0.25-35 MG-MCG tablet        The current medical regimen is effective;  continue present plan and medications.           COUNSELING:  Reviewed preventive health counseling, as reflected in patient instructions    Estimated body mass index is 23.82 kg/m  as calculated from the following:    Height as of this encounter: 1.537 m (5' 0.5\").    Weight as of this encounter: 56.2 kg (124 " lb).        She reports that she has never smoked. She has never used smokeless tobacco.      Counseling Resources:  ATP IV Guidelines  Pooled Cohorts Equation Calculator  Breast Cancer Risk Calculator  BRCA-Related Cancer Risk Assessment: FHS-7 Tool  FRAX Risk Assessment  ICSI Preventive Guidelines  Dietary Guidelines for Americans, 2010  USDA's MyPlate  ASA Prophylaxis  Lung CA Screening    Corrine Quintanilla NP  [facility]

## 2022-11-19 ENCOUNTER — HEALTH MAINTENANCE LETTER (OUTPATIENT)
Age: 47
End: 2022-11-19

## 2023-02-27 ENCOUNTER — ANCILLARY PROCEDURE (OUTPATIENT)
Dept: MAMMOGRAPHY | Facility: CLINIC | Age: 48
End: 2023-02-27
Attending: STUDENT IN AN ORGANIZED HEALTH CARE EDUCATION/TRAINING PROGRAM
Payer: COMMERCIAL

## 2023-02-27 DIAGNOSIS — Z12.31 VISIT FOR SCREENING MAMMOGRAM: ICD-10-CM

## 2023-02-27 PROCEDURE — 77063 BREAST TOMOSYNTHESIS BI: CPT | Mod: TC | Performed by: RADIOLOGY

## 2023-02-27 PROCEDURE — 77067 SCR MAMMO BI INCL CAD: CPT | Mod: TC | Performed by: RADIOLOGY

## 2023-03-07 ENCOUNTER — OFFICE VISIT (OUTPATIENT)
Dept: FAMILY MEDICINE | Facility: CLINIC | Age: 48
End: 2023-03-07
Payer: COMMERCIAL

## 2023-03-07 VITALS
BODY MASS INDEX: 22.84 KG/M2 | DIASTOLIC BLOOD PRESSURE: 80 MMHG | WEIGHT: 121 LBS | RESPIRATION RATE: 16 BRPM | HEART RATE: 72 BPM | TEMPERATURE: 98.1 F | HEIGHT: 61 IN | SYSTOLIC BLOOD PRESSURE: 124 MMHG | OXYGEN SATURATION: 98 %

## 2023-03-07 DIAGNOSIS — L98.9 SKIN LESION: Primary | ICD-10-CM

## 2023-03-07 PROCEDURE — 99213 OFFICE O/P EST LOW 20 MIN: CPT | Performed by: FAMILY MEDICINE

## 2023-03-07 ASSESSMENT — PAIN SCALES - GENERAL: PAINLEVEL: NO PAIN (0)

## 2023-03-07 NOTE — PROGRESS NOTES
"  Assessment & Plan     Skin lesion  See below.  I believe most likely it is edge of xiphoid process versus there may be a small cyst.  Regardless I reassured her as she has no other constitutional symptoms or other risk factor.  If she is noticing worsening of the size or noticing more pain I would recommend imaging.  She agreed.                   No follow-ups on file.    Blaise Portillo MD, MD  [facility]    Corona Gamboa is a 47 year old, presenting for the following health issues:  Mass (Lump Concern)      History of Present Illness       Reason for visit:  Bump on chest that is under the skin  Symptom onset:  3-4 weeks ago  Symptoms include:  None  Symptom intensity:  Mild  Symptom progression:  Staying the same  Had these symptoms before:  No  What makes it worse:  No  What makes it better:  N/Eben consumes 1 sweetened beverage(s) daily.She exercises with enough effort to increase her heart rate 20 to 29 minutes per day.  She exercises with enough effort to increase her heart rate 3 or less days per week.   She is taking medications regularly.    Noticed a month ago. No change since noticed.   Not sure if it has been there before.   If press enough - it hurt.      Review of Systems         Objective    /80   Pulse 72   Temp 98.1  F (36.7  C) (Oral)   Resp 16   Ht 1.537 m (5' 0.5\")   Wt 54.9 kg (121 lb)   SpO2 98%   BMI 23.24 kg/m    Body mass index is 23.24 kg/m .  Physical Exam   Just left of lateral to xiphoid process small cystic lesion present.  I believe it is most likely still just the age of xiphoid process versus there may be a small skin cyst.                    "

## 2023-03-16 DIAGNOSIS — N83.201 CYST OF RIGHT OVARY: ICD-10-CM

## 2023-03-17 RX ORDER — NORGESTIMATE AND ETHINYL ESTRADIOL 0.25-0.035
KIT ORAL
Qty: 84 TABLET | Refills: 3 | Status: SHIPPED | OUTPATIENT
Start: 2023-03-17 | End: 2024-02-15

## 2023-03-17 NOTE — TELEPHONE ENCOUNTER
Prescription approved per [facility] Refill Protocol.    Salena Rojas, BSN RN  [facility]

## 2023-04-09 ENCOUNTER — HEALTH MAINTENANCE LETTER (OUTPATIENT)
Age: 48
End: 2023-04-09

## 2024-02-15 DIAGNOSIS — N83.201 CYST OF RIGHT OVARY: ICD-10-CM

## 2024-02-15 RX ORDER — NORGESTIMATE AND ETHINYL ESTRADIOL 0.25-0.035
KIT ORAL
Qty: 84 TABLET | Refills: 0 | Status: SHIPPED | OUTPATIENT
Start: 2024-02-15 | End: 2024-05-12

## 2024-04-04 ENCOUNTER — ANCILLARY PROCEDURE (OUTPATIENT)
Dept: MAMMOGRAPHY | Facility: CLINIC | Age: 49
End: 2024-04-04
Attending: NURSE PRACTITIONER
Payer: COMMERCIAL

## 2024-04-04 DIAGNOSIS — Z12.31 SCREENING MAMMOGRAM, ENCOUNTER FOR: ICD-10-CM

## 2024-04-04 PROCEDURE — 77067 SCR MAMMO BI INCL CAD: CPT | Mod: TC | Performed by: RADIOLOGY

## 2024-04-04 PROCEDURE — 77063 BREAST TOMOSYNTHESIS BI: CPT | Mod: TC | Performed by: RADIOLOGY

## 2024-05-09 DIAGNOSIS — N83.201 CYST OF RIGHT OVARY: ICD-10-CM

## 2024-05-12 RX ORDER — NORGESTIMATE AND ETHINYL ESTRADIOL 0.25-0.035
KIT ORAL
Qty: 84 TABLET | Refills: 0 | Status: SHIPPED | OUTPATIENT
Start: 2024-05-12 | End: 2024-06-12

## 2024-06-12 ENCOUNTER — OFFICE VISIT (OUTPATIENT)
Dept: FAMILY MEDICINE | Facility: CLINIC | Age: 49
End: 2024-06-12
Payer: COMMERCIAL

## 2024-06-12 VITALS
DIASTOLIC BLOOD PRESSURE: 75 MMHG | HEART RATE: 65 BPM | OXYGEN SATURATION: 100 % | TEMPERATURE: 97.6 F | RESPIRATION RATE: 18 BRPM | HEIGHT: 61 IN | SYSTOLIC BLOOD PRESSURE: 120 MMHG | WEIGHT: 122 LBS | BODY MASS INDEX: 23.03 KG/M2

## 2024-06-12 DIAGNOSIS — Z00.00 ROUTINE GENERAL MEDICAL EXAMINATION AT A HEALTH CARE FACILITY: Primary | ICD-10-CM

## 2024-06-12 DIAGNOSIS — N83.201 CYST OF RIGHT OVARY: ICD-10-CM

## 2024-06-12 PROCEDURE — 90471 IMMUNIZATION ADMIN: CPT | Performed by: NURSE PRACTITIONER

## 2024-06-12 PROCEDURE — 99396 PREV VISIT EST AGE 40-64: CPT | Mod: 25 | Performed by: NURSE PRACTITIONER

## 2024-06-12 PROCEDURE — 90632 HEPA VACCINE ADULT IM: CPT | Performed by: NURSE PRACTITIONER

## 2024-06-12 RX ORDER — NORGESTIMATE AND ETHINYL ESTRADIOL 0.25-0.035
KIT ORAL
Qty: 84 TABLET | Refills: 4 | Status: SHIPPED | OUTPATIENT
Start: 2024-06-12

## 2024-06-12 SDOH — HEALTH STABILITY: PHYSICAL HEALTH: ON AVERAGE, HOW MANY DAYS PER WEEK DO YOU ENGAGE IN MODERATE TO STRENUOUS EXERCISE (LIKE A BRISK WALK)?: 3 DAYS

## 2024-06-12 SDOH — HEALTH STABILITY: PHYSICAL HEALTH: ON AVERAGE, HOW MANY MINUTES DO YOU ENGAGE IN EXERCISE AT THIS LEVEL?: 40 MIN

## 2024-06-12 ASSESSMENT — SOCIAL DETERMINANTS OF HEALTH (SDOH): HOW OFTEN DO YOU GET TOGETHER WITH FRIENDS OR RELATIVES?: MORE THAN THREE TIMES A WEEK

## 2024-06-12 NOTE — PROGRESS NOTES
Preventive Care Visit  [facility]  Corrine Quintanilla, NP, Nurse Practitioner - Family  Jun 12, 2024      Assessment & Plan     (Z00.00) Routine general medical examination at a health care facility  (primary encounter diagnosis)  Comment:   Plan:     (N83.201) Cyst of right ovary  Comment: prevention  Plan: norgestimate-ethinyl estradiol (ESTARYLLA)         0.25-35 MG-MCG tablet        The current medical regimen is effective;  continue present plan and medications.             Counseling  Appropriate preventive services were discussed with this patient, including applicable screening as appropriate for fall prevention, nutrition, physical activity, Tobacco-use cessation, weight loss and cognition.  Checklist reviewing preventive services available has been given to the patient.  Reviewed patient's diet, addressing concerns and/or questions.   She is at risk for lack of exercise and has been provided with information to increase physical activity for the benefit of her well-being.           Corona Gamboa is a 49 year old, presenting for the following:  Physical        6/12/2024     2:54 PM   Additional Questions   [facility]  Patient does not have a Health Care Directive or Living Will: Discussed advance care planning with patient; however, patient declined at this time.    HPI              6/12/2024   General Health   How would you rate your overall physical health? Good   Feel stress (tense, anxious, or unable to sleep) To some extent   (!) STRESS CONCERN      6/12/2024   Nutrition   Three or more servings of calcium each day? Yes   Diet: Other   If other, please elaborate: Avoid dairy   How many servings of fruit and vegetables per day? (!) 2-3   How many sweetened beverages each day? 0-1         6/12/2024   Exercise   Days per week of moderate/strenous exercise 3 days   Average minutes spent exercising at this level 40 min         6/12/2024    Social Factors   Frequency of gathering with friends or relatives More than three times a week   Worry food won't last until get money to buy more No   Food not last or not have enough money for food? No   Do you have housing?  Yes   Are you worried about losing your housing? No   Lack of transportation? No   Unable to get utilities (heat,electricity)? No         6/12/2024   Dental   Dentist two times every year? Yes         6/12/2024   TB Screening   Were you born outside of the [place]? Yes         Today's PHQ-2 Score:       6/12/2024     2:22 PM   PHQ-2 ( 1999 Pfizer)   Q1: Little interest or pleasure in doing things 0   Q2: Feeling down, depressed or hopeless 0   PHQ-2 Score 0   Q1: Little interest or pleasure in doing things Not at all   Q2: Feeling down, depressed or hopeless Not at all   PHQ-2 Score 0           6/12/2024   Substance Use   Alcohol more than 3/day or more than 7/wk Not Applicable   Do you use any other substances recreationally? No     Social History     Tobacco Use    Smoking status: Never    Smokeless tobacco: Never   Vaping Use    Vaping status: Never Used   Substance Use Topics    Alcohol use: No     Comment: rarely    Drug use: No           4/4/2024   LAST FHS-7 RESULTS   1st degree relative breast or ovarian cancer Unknown   Any relative bilateral breast cancer Unknown   Any male have breast cancer Unknown   Any ONE woman have BOTH breast AND ovarian cancer Unknown   Any woman with breast cancer before 50yrs Unknown   2 or more relatives with breast AND/OR ovarian cancer Unknown   2 or more relatives with breast AND/OR bowel cancer Unknown                6/12/2024   STI Screening   New sexual partner(s) since last STI/HIV test? No     History of abnormal Pap smear: No - age 30- 64 PAP with HPV every 5 years recommended        Latest Ref Rng & Units 12/8/2020     4:12 PM 12/8/2020     4:03 PM 5/22/2017     2:17 PM   PAP / HPV   PAP (Historical)   NIL     HPV 16 DNA NEG^Negative Negative    "Negative    HPV 18 DNA NEG^Negative Negative   Negative    Other HR HPV NEG^Negative Negative   Negative      ASCVD Risk   The 10-year ASCVD risk score (Wojciech WU, et al., 2019) is: 0.6%    Values used to calculate the score:      Age: 49 years      Sex: Female      Is Non- : No      Diabetic: No      Tobacco smoker: No      Systolic Blood Pressure: 120 mmHg      Is BP treated: No      HDL Cholesterol: 83 mg/dL      Total Cholesterol: 207 mg/dL        6/12/2024   Contraception/Family Planning   Questions about contraception or family planning No        Reviewed and updated as needed this visit by Provider                             Objective    Exam  /75 (BP Location: Right arm, Patient Position: Sitting, Cuff Size: Adult Regular)   Pulse 65   Temp 97.6  F (36.4  C) (Temporal)   Resp 18   Ht 1.56 m (5' 1.42\")   Wt 55.3 kg (122 lb)   LMP 05/27/2024   SpO2 100%   BMI 22.74 kg/m     Estimated body mass index is 22.74 kg/m  as calculated from the following:    Height as of this encounter: 1.56 m (5' 1.42\").    Weight as of this encounter: 55.3 kg (122 lb).    Physical Exam  GENERAL: alert and no distress  EYES: Eyes grossly normal to inspection, PERRL and conjunctivae and sclerae normal  HENT: ear canals and TM's normal, nose and mouth without ulcers or lesions  NECK: no adenopathy, no asymmetry, masses, or scars  RESP: lungs clear to auscultation - no rales, rhonchi or wheezes  BREAST: normal without masses, tenderness or nipple discharge and no palpable axillary masses or adenopathy  CV: regular rate and rhythm, normal S1 S2, no S3 or S4, no murmur, click or rub, no peripheral edema  ABDOMEN: soft, nontender, no hepatosplenomegaly, no masses and bowel sounds normal  MS: no gross musculoskeletal defects noted, no edema  SKIN: no suspicious lesions or rashes  NEURO: Normal strength and tone, mentation intact and speech normal  PSYCH: mentation appears normal, affect " normal/bright        Signed Electronically by: Corrine Quintanilla, NP

## 2024-06-12 NOTE — PATIENT INSTRUCTIONS
"Patient Education   Preventive Care Advice   This is general advice we often give to help people stay healthy. Your care team may have specific advice just for you. Please talk to your care team about your own preventive care needs.  Lifestyle  Exercise at least 150 minutes each week (30 minutes a day, 5 days a week).  Do muscle strengthening activities 2 days a week. These help control your weight and prevent disease.  No smoking.  Wear sunscreen to prevent skin cancer.  Have your home tested for radon every 2 to 5 years. Radon is a colorless, odorless gas that can harm your lungs. To learn more, go to www.health.[facility].[place].us and search for \"Radon in Homes.\"  Keep guns unloaded and locked up in a safe place like a safe or gun vault, or, use a gun lock and hide the keys. Always lock away bullets separately. To learn more, visit WalkSource.mn.gov and search for \"safe gun storage.\"  Nutrition  Eat 5 or more servings of fruits and vegetables each day.  Try wheat bread, brown rice and whole grain pasta (instead of white bread, rice, and pasta).  Get enough calcium and vitamin D. Check the label on foods and aim for 100% of the RDA (recommended daily allowance).  Regular exams  Have a dental exam and cleaning every 6 months.  See your health care team every year to talk about:  Any changes in your health.  Any medicines your care team has prescribed.  Preventive care, family planning, and ways to prevent chronic diseases.  Shots (vaccines)   HPV shots (up to age 26), if you've never had them before.  Hepatitis B shots (up to age 59), if you've never had them before.  COVID-19 shot: Get this shot when it's due.  Flu shot: Get a flu shot every year.  Tetanus shot: Get a tetanus shot every 10 years.  Pneumococcal, hepatitis A, and RSV shots: Ask your care team if you need these based on your risk.  Shingles shot (for age 50 and up).  General health tests  Diabetes screening:  Starting at age 35, Get screened for diabetes at least " every 3 years.  If you are younger than age 35, ask your care team if you should be screened for diabetes.  Cholesterol test: At age 39, start having a cholesterol test every 5 years, or more often if advised.  Bone density scan (DEXA): At age 50, ask your care team if you should have this scan for osteoporosis (brittle bones).  Hepatitis C: Get tested at least once in your life.  Abdominal aortic aneurysm screening: Talk to your doctor about having this screening if you:  Have ever smoked; and  Are biologically male; and  Are between the ages of 65 and 75.  STIs (sexually transmitted infections)  Before age 24: Ask your care team if you should be screened for STIs.  After age 24: Get screened for STIs if you're at risk. You are at risk for STIs (including HIV) if:  You are sexually active with more than one person.  You don't use condoms every time.  You or a partner was diagnosed with a sexually transmitted infection.  If you are at risk for HIV, ask about PrEP medicine to prevent HIV.  Get tested for HIV at least once in your life, whether you are at risk for HIV or not.  Cancer screening tests  Cervical cancer screening: If you have a cervix, begin getting regular cervical cancer screening tests at age 21. Most people who have regular screenings with normal results can stop after age 65. Talk about this with your provider.  Breast cancer scan (mammogram): If you've ever had breasts, begin having regular mammograms starting at age 40. This is a scan to check for breast cancer.  Colon cancer screening: It is important to start screening for colon cancer at age 45.  Have a colonoscopy test every 10 years (or more often if you're at risk) Or, ask your provider about stool tests like a FIT test every year or Cologuard test every 3 years.  To learn more about your testing options, visit: www.HumanAPI/248754.pdf.  For help making a decision, visit: albert/vg16011.  Prostate cancer screening test: If you have a  prostate and are age 55 to 69, ask your provider if you would benefit from a yearly prostate cancer screening test.  Lung cancer screening: If you are a current or former smoker age 50 to 80, ask your care team if ongoing lung cancer screenings are right for you.  For informational purposes only. Not to replace the advice of your health care provider. Copyright   2023 [facility]. All rights reserved. Clinically reviewed by the [facility] Transitions Program. [employer] [id number] - REV 04/24.

## 2024-06-13 ENCOUNTER — PATIENT OUTREACH (OUTPATIENT)
Dept: GASTROENTEROLOGY | Facility: CLINIC | Age: 49
End: 2024-06-13
Payer: COMMERCIAL

## 2024-09-16 DIAGNOSIS — N83.201 CYST OF RIGHT OVARY: ICD-10-CM

## 2024-09-16 RX ORDER — NORGESTIMATE AND ETHINYL ESTRADIOL 0.25-0.035
KIT ORAL
Qty: 84 TABLET | Refills: 4 | Status: CANCELLED | OUTPATIENT
Start: 2024-09-16

## 2024-09-16 RX ORDER — NORGESTIMATE AND ETHINYL ESTRADIOL 0.25-0.035
KIT ORAL
Qty: 84 TABLET | Refills: 3 | OUTPATIENT
Start: 2024-09-16

## 2024-09-16 RX ORDER — NORGESTIMATE AND ETHINYL ESTRADIOL 0.25-0.035
1 KIT ORAL DAILY
Qty: 30 TABLET | Refills: 0 | Status: SHIPPED | OUTPATIENT
Start: 2024-09-16

## 2024-09-16 NOTE — TELEPHONE ENCOUNTER
"Requesting 30 day supply to [facility] as [facility] told [facility] was \"on hold\" due to PCP office. On our end this is not the case, but I do see a note attached that patient would reach out to pharm when she was due for next fill, maybe this is involved.      "

## 2024-10-11 DIAGNOSIS — N83.201 CYST OF RIGHT OVARY: ICD-10-CM

## 2024-10-11 RX ORDER — NORGESTIMATE AND ETHINYL ESTRADIOL 0.25-0.035
1 KIT ORAL DAILY
Qty: 28 TABLET | OUTPATIENT
Start: 2024-10-11

## 2025-04-14 ENCOUNTER — ANCILLARY PROCEDURE (OUTPATIENT)
Dept: MAMMOGRAPHY | Facility: CLINIC | Age: 50
End: 2025-04-14
Payer: COMMERCIAL

## 2025-04-14 DIAGNOSIS — Z12.31 VISIT FOR SCREENING MAMMOGRAM: ICD-10-CM

## 2025-04-14 PROCEDURE — 77063 BREAST TOMOSYNTHESIS BI: CPT | Mod: TC | Performed by: RADIOLOGY

## 2025-04-14 PROCEDURE — 77067 SCR MAMMO BI INCL CAD: CPT | Mod: TC | Performed by: RADIOLOGY

## 2025-05-18 SDOH — HEALTH STABILITY: PHYSICAL HEALTH: ON AVERAGE, HOW MANY MINUTES DO YOU ENGAGE IN EXERCISE AT THIS LEVEL?: 40 MIN

## 2025-05-18 SDOH — HEALTH STABILITY: PHYSICAL HEALTH: ON AVERAGE, HOW MANY DAYS PER WEEK DO YOU ENGAGE IN MODERATE TO STRENUOUS EXERCISE (LIKE A BRISK WALK)?: 2 DAYS

## 2025-05-18 ASSESSMENT — SOCIAL DETERMINANTS OF HEALTH (SDOH): HOW OFTEN DO YOU GET TOGETHER WITH FRIENDS OR RELATIVES?: THREE TIMES A WEEK

## 2025-05-19 ENCOUNTER — OFFICE VISIT (OUTPATIENT)
Dept: FAMILY MEDICINE | Facility: CLINIC | Age: 50
End: 2025-05-19
Payer: COMMERCIAL

## 2025-05-19 VITALS
WEIGHT: 120.9 LBS | OXYGEN SATURATION: 97 % | RESPIRATION RATE: 22 BRPM | HEIGHT: 60 IN | BODY MASS INDEX: 23.74 KG/M2 | TEMPERATURE: 97.4 F | DIASTOLIC BLOOD PRESSURE: 75 MMHG | SYSTOLIC BLOOD PRESSURE: 118 MMHG | HEART RATE: 72 BPM

## 2025-05-19 DIAGNOSIS — Z30.41 ENCOUNTER FOR SURVEILLANCE OF CONTRACEPTIVE PILLS: ICD-10-CM

## 2025-05-19 DIAGNOSIS — Z00.00 ROUTINE GENERAL MEDICAL EXAMINATION AT A HEALTH CARE FACILITY: Primary | ICD-10-CM

## 2025-05-19 DIAGNOSIS — R41.840 DIFFICULTY CONCENTRATING: ICD-10-CM

## 2025-05-19 PROCEDURE — 90471 IMMUNIZATION ADMIN: CPT | Performed by: NURSE PRACTITIONER

## 2025-05-19 PROCEDURE — 1126F AMNT PAIN NOTED NONE PRSNT: CPT | Performed by: NURSE PRACTITIONER

## 2025-05-19 PROCEDURE — 90750 HZV VACC RECOMBINANT IM: CPT | Performed by: NURSE PRACTITIONER

## 2025-05-19 PROCEDURE — 99396 PREV VISIT EST AGE 40-64: CPT | Mod: 25 | Performed by: NURSE PRACTITIONER

## 2025-05-19 PROCEDURE — 3078F DIAST BP <80 MM HG: CPT | Performed by: NURSE PRACTITIONER

## 2025-05-19 PROCEDURE — 3074F SYST BP LT 130 MM HG: CPT | Performed by: NURSE PRACTITIONER

## 2025-05-19 RX ORDER — NORGESTIMATE AND ETHINYL ESTRADIOL 0.25-0.035
KIT ORAL
Qty: 84 TABLET | Refills: 4 | Status: CANCELLED | OUTPATIENT
Start: 2025-05-19

## 2025-05-19 RX ORDER — DESOGESTREL AND ETHINYL ESTRADIOL 0.15-0.03
1 KIT ORAL DAILY
Qty: 84 TABLET | Refills: 3 | Status: SHIPPED | OUTPATIENT
Start: 2025-05-19

## 2025-05-19 ASSESSMENT — PAIN SCALES - GENERAL: PAINLEVEL_OUTOF10: NO PAIN (0)

## 2025-05-19 NOTE — PROGRESS NOTES
Preventive Care Visit  [facility]  Corrine Quintanilla, NP, Nurse Practitioner - Family  May 19, 2025      Assessment & Plan     (Z00.00) Routine general medical examination at a health care facility  (primary encounter diagnosis)  Comment:   Plan:     (R41.530) Difficulty concentrating  Comment:   Plan: Adult Mental Health  Referral        Will refer for ADHD evaluation.  ONce this is done and if she has a diagnosis, she can follow up with me for discussion of medication options.     (Z30.41) Encounter for surveillance of contraceptive pills  Comment:   Plan: desogestrel-ethinyl estradiol (APRI) 0.15-30         MG-MCG tablet        Will try changing her OCP to see if this helps with libido, sleep.                Counseling  Appropriate preventive services were addressed with this patient via screening, questionnaire, or discussion as appropriate for fall prevention, nutrition, physical activity, Tobacco-use cessation, social engagement, weight loss and cognition.  Checklist reviewing preventive services available has been given to the patient.  Reviewed patient's diet, addressing concerns and/or questions.   She is at risk for lack of exercise and has been provided with information to increase physical activity for the benefit of her well-being.   She is at risk for psychosocial distress and has been provided with information to reduce risk.           Corona Maier is a 50 year old, presenting for the following:  Physical and Menopausal Sx (Not sleeping like usual, wakes up really warm, the having trouble falling back asleep due to troubled mine./Low labetalol /Concentration, not sure if related to menopause, but having trouble staying focused.)           HPI  Sleep isn't as good; she is waking up hot during the night, and has difficulty falling back to sleep due to her mind racing.  Decreased libido.  Decreased concentration and focus.   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             Advance Care Planning    Discussed advance care planning with patient; informed AVS has link to [facility] [employer].        5/18/2025   General Health   How would you rate your overall physical health? Excellent   Feel stress (tense, anxious, or unable to sleep) To some extent   (!) STRESS CONCERN      5/18/2025   Nutrition   Three or more servings of calcium each day? Yes   Diet: Regular (no restrictions)   How many servings of fruit and vegetables per day? (!) 2-3   How many sweetened beverages each day? 0-1         5/18/2025   Exercise   Days per week of moderate/strenous exercise 2 days   Average minutes spent exercising at this level 40 min   (!) EXERCISE CONCERN      5/18/2025   Social Factors   Frequency of gathering with friends or relatives Three times a week   Worry food won't last until get money to buy more No   Food not last or not have enough money for food? No   Do you have housing? (Housing is defined as stable permanent housing and does not include staying outside in a car, in a tent, in an abandoned building, in an overnight shelter, or couch-surfing.) Yes   Are you worried about losing your housing? No   Lack of transportation? No   Unable to get utilities (heat,electricity)? No         5/18/2025   Fall Risk   Fallen 2 or more times in the past year? No   Trouble with walking or balance? No          5/18/2025   Dental   Dentist two times every year? Yes         Today's PHQ-2 Score:       5/18/2025    10:03 PM   PHQ-2 ( 1999 [employer])   Q1: Little interest or pleasure in doing things 0   Q2: Feeling down, depressed or hopeless 0   PHQ-2 Score 0    Q1: Little interest or pleasure in doing things Not at all   Q2: Feeling down, depressed or hopeless Not at all   PHQ-2 Score 0       Patient-reported           5/18/2025   Substance Use   Alcohol more than 3/day or more than 7/wk Not Applicable   Do you use  "any other substances recreationally? No     Social History     Tobacco Use    Smoking status: Never    Smokeless tobacco: Never   Vaping Use    Vaping status: Never Used   Substance Use Topics    Alcohol use: No     Comment: rarely    Drug use: No           4/4/2024   LAST FHS-7 RESULTS   1st degree relative breast or ovarian cancer Unknown   Any relative bilateral breast cancer Unknown   Any male have breast cancer Unknown   Any ONE woman have BOTH breast AND ovarian cancer Unknown   Any woman with breast cancer before 50yrs Unknown   2 or more relatives with breast AND/OR ovarian cancer Unknown   2 or more relatives with breast AND/OR bowel cancer Unknown                5/18/2025   STI Screening   New sexual partner(s) since last STI/HIV test? No     History of abnormal Pap smear: No - age 30- 64 PAP with HPV every 5 years recommended        Latest Ref Rng & Units 12/8/2020     4:12 PM 12/8/2020     4:03 PM 5/22/2017     2:17 PM   PAP / HPV   PAP (Historical)   NIL     HPV 16 DNA NEG^Negative Negative   Negative    HPV 18 DNA NEG^Negative Negative   Negative    Other HR HPV NEG^Negative Negative   Negative      ASCVD Risk   The 10-year ASCVD risk score (Wojciech WU, et al., 2019) is: 0.7%    Values used to calculate the score:      Age: 50 years      Sex: Female      Is Non- : No      Diabetic: No      Tobacco smoker: No      Systolic Blood Pressure: 118 mmHg      Is BP treated: No      HDL Cholesterol: 83 mg/dL      Total Cholesterol: 207 mg/dL            5/18/2025   Contraception/Family Planning   Questions about contraception or family planning No        Reviewed and updated as needed this visit by Provider                             Objective    Exam  /75 (BP Location: Right arm, Patient Position: Sitting, Cuff Size: Adult Regular)   Pulse 72   Temp 97.4  F (36.3  C) (Temporal)   Resp 22   Ht 1.535 m (5' 0.43\")   Wt 54.8 kg (120 lb 14.4 oz)   LMP  (LMP Unknown)   " "SpO2 97%   BMI 23.27 kg/m     Estimated body mass index is 23.27 kg/m  as calculated from the following:    Height as of this encounter: 1.535 m (5' 0.43\").    Weight as of this encounter: 54.8 kg (120 lb 14.4 oz).    Physical Exam  GENERAL: alert and no distress  EYES: Eyes grossly normal to inspection, PERRL and conjunctivae and sclerae normal  HENT: ear canals and TM's normal, nose and mouth without ulcers or lesions  NECK: no adenopathy, no asymmetry, masses, or scars  RESP: lungs clear to auscultation - no rales, rhonchi or wheezes  CV: regular rate and rhythm, normal S1 S2, no S3 or S4, no murmur, click or rub, no peripheral edema  ABDOMEN: soft, nontender, no hepatosplenomegaly, no masses and bowel sounds normal  MS: no gross musculoskeletal defects noted, no edema  SKIN: no suspicious lesions or rashes  NEURO: Normal strength and tone, mentation intact and speech normal  PSYCH: mentation appears normal, affect normal/bright        Signed Electronically by: Corrine Quintanilla NP    "

## 2025-05-19 NOTE — PATIENT INSTRUCTIONS
Patient Education   Preventive Care Advice   This is general advice given by our system to help you stay healthy. However, your care team may have specific advice just for you. Please talk to your care team about your preventive care needs.  Nutrition  Eat 5 or more servings of fruits and vegetables each day.  Try wheat bread, brown rice and whole grain pasta (instead of white bread, rice, and pasta).  Get enough calcium and vitamin D. Check the label on foods and aim for 100% of the RDA (recommended daily allowance).  Lifestyle  Exercise at least 150 minutes each week  (30 minutes a day, 5 days a week).  Do muscle strengthening activities 2 days a week. These help control your weight and prevent disease.  No smoking.  Wear sunscreen to prevent skin cancer.  Have a dental exam and cleaning every 6 months.  Yearly exams  See your health care team every year to talk about:  Any changes in your health.  Any medicines your care team has prescribed.  Preventive care, family planning, and ways to prevent chronic diseases.  Shots (vaccines)   HPV shots (up to age 26), if you've never had them before.  Hepatitis B shots (up to age 59), if you've never had them before.  COVID-19 shot: Get this shot when it's due.  Flu shot: Get a flu shot every year.  Tetanus shot: Get a tetanus shot every 10 years.  Pneumococcal, hepatitis A, and RSV shots: Ask your care team if you need these based on your risk.  Shingles shot (for age 50 and up)  General health tests  Diabetes screening:  Starting at age 35, Get screened for diabetes at least every 3 years.  If you are younger than age 35, ask your care team if you should be screened for diabetes.  Cholesterol test: At age 39, start having a cholesterol test every 5 years, or more often if advised.  Bone density scan (DEXA): At age 50, ask your care team if you should have this scan for osteoporosis (brittle bones).  Hepatitis C: Get tested at least once in your life.  STIs (sexually  transmitted infections)  Before age 24: Ask your care team if you should be screened for STIs.  After age 24: Get screened for STIs if you're at risk. You are at risk for STIs (including HIV) if:  You are sexually active with more than one person.  You don't use condoms every time.  You or a partner was diagnosed with a sexually transmitted infection.  If you are at risk for HIV, ask about PrEP medicine to prevent HIV.  Get tested for HIV at least once in your life, whether you are at risk for HIV or not.  Cancer screening tests  Cervical cancer screening: If you have a cervix, begin getting regular cervical cancer screening tests starting at age 21.  Breast cancer scan (mammogram): If you've ever had breasts, begin having regular mammograms starting at age 40. This is a scan to check for breast cancer.  Colon cancer screening: It is important to start screening for colon cancer at age 45.  Have a colonoscopy test every 10 years (or more often if you're at risk) Or, ask your provider about stool tests like a FIT test every year or Cologuard test every 3 years.  To learn more about your testing options, visit:   .  For help making a decision, visit:   https://bit.ly/se32473.  Prostate cancer screening test: If you have a prostate, ask your care team if a prostate cancer screening test (PSA) at age 55 is right for you.  Lung cancer screening: If you are a current or former smoker ages 50 to 80, ask your care team if ongoing lung cancer screenings are right for you.  For informational purposes only. Not to replace the advice of your health care provider. Copyright   2023 [facility]. All rights reserved. Clinically reviewed by the [facility] Transitions Program. [employer] [id number] - REV 01/24.  Learning About Stress  What is stress?     Stress is your body's response to a hard situation. Your body can have a physical, emotional, or mental response. Stress is a fact of life for most people, and it  affects everyone differently. What causes stress for you may not be stressful for someone else.  A lot of things can cause stress. You may feel stress when you go on a job interview, take a test, or run a race. This kind of short-term stress is normal and even useful. It can help you if you need to work hard or react quickly. For example, stress can help you finish an important job on time.  Long-term stress is caused by ongoing stressful situations or events. Examples of long-term stress include long-term health problems, ongoing problems at work, or conflicts in your family. Long-term stress can harm your health.  How does stress affect your health?  When you are stressed, your body responds as though you are in danger. It makes hormones that speed up your heart, make you breathe faster, and give you a burst of energy. This is called the fight-or-flight stress response. If the stress is over quickly, your body goes back to normal and no harm is done.  But if stress happens too often or lasts too long, it can have bad effects. Long-term stress can make you more likely to get sick, and it can make symptoms of some diseases worse. If you tense up when you are stressed, you may develop neck, shoulder, or low back pain. Stress is linked to high blood pressure and heart disease.  Stress also harms your emotional health. It can make you muhammad, tense, or depressed. Your relationships may suffer, and you may not do well at work or school.  What can you do to manage stress?  You can try these things to help manage stress:   Do something active. Exercise or activity can help reduce stress. Walking is a great way to get started. Even everyday activities such as housecleaning or yard work can help.  Try yoga or milo chi. These techniques combine exercise and meditation. You may need some training at first to learn them.  Do something you enjoy. For example, listen to music or go to a movie. Practice your hobby or do volunteer  "work.  Meditate. This can help you relax, because you are not worrying about what happened before or what may happen in the future.  Do guided imagery. Imagine yourself in any setting that helps you feel calm. You can use online videos, books, or a teacher to guide you.  Do breathing exercises. For example:  From a standing position, bend forward from the waist with your knees slightly bent. Let your arms dangle close to the floor.  Breathe in slowly and deeply as you return to a standing position. Roll up slowly and lift your head last.  Hold your breath for just a few seconds in the standing position.  Breathe out slowly and bend forward from the waist.  Let your feelings out. Talk, laugh, cry, and express anger when you need to. Talking with supportive friends or family, a counselor, or a barbara leader about your feelings is a healthy way to relieve stress. Avoid discussing your feelings with people who make you feel worse.  Write. It may help to write about things that are bothering you. This helps you find out how much stress you feel and what is causing it. When you know this, you can find better ways to cope.  What can you do to prevent stress?  You might try some of these things to help prevent stress:  Manage your time. This helps you find time to do the things you want and need to do.  Get enough sleep. Your body recovers from the stresses of the day while you are sleeping.  Get support. Your family, friends, and community can make a difference in how you experience stress.  Limit your news feed. Avoid or limit time on social media or news that may make you feel stressed.  Do something active. Exercise or activity can help reduce stress. Walking is a great way to get started.  Where can you learn more?  Go to https://www.Fareye.net/patiented  Enter N032 in the search box to learn more about \"Learning About Stress.\"  Current as of: October 24, 2024  Content Version: 14.4 2024-2025 [facility].   Care instructions adapted under license by your healthcare professional. If you have questions about a medical condition or this instruction, always ask your healthcare professional. [employer] disclaims any warranty or liability for your use of this information.

## 2025-05-19 NOTE — NURSING NOTE
Prior to immunization administration, verified patients identity using patient s name and date of birth. Please see Immunization Activity for additional information.     Screening Questionnaire for Adult Immunization    Are you sick today?   No   Do you have allergies to medications, food, a vaccine component or latex?   Yes, Sulfa antibiotics   Have you ever had a serious reaction after receiving a vaccination?   No   Do you have a long-term health problem with heart, lung, kidney, or metabolic disease (e.g., diabetes), asthma, a blood disorder, no spleen, complement component deficiency, a cochlear implant, or a spinal fluid leak?  Are you on long-term aspirin therapy?   No   Do you have cancer, leukemia, HIV/AIDS, or any other immune system problem?   No   Do you have a parent, brother, or sister with an immune system problem?   No   In the past 3 months, have you taken medications that affect  your immune system, such as prednisone, other steroids, or anticancer drugs; drugs for the treatment of rheumatoid arthritis, Crohn s disease, or psoriasis; or have you had radiation treatments?   No   Have you had a seizure, or a brain or other nervous system problem?   No   During the past year, have you received a transfusion of blood or blood    products, or been given immune (gamma) globulin or antiviral drug?   No   For women: Are you pregnant or is there a chance you could become       pregnant during the next month?   No   Have you received any vaccinations in the past 4 weeks?   No     Immunization questionnaire was positive for at least one answer.  Notified Corrine Quintanilla NP.      Patient instructed to remain in clinic for 15 minutes afterwards, and to report any adverse reactions.     Screening performed by Merry Guerra MA on 5/19/2025 at 3:23 PM.

## 2025-05-20 ENCOUNTER — PATIENT OUTREACH (OUTPATIENT)
Dept: CARE COORDINATION | Facility: CLINIC | Age: 50
End: 2025-05-20
Payer: COMMERCIAL

## 2025-05-22 ENCOUNTER — PATIENT OUTREACH (OUTPATIENT)
Dept: CARE COORDINATION | Facility: CLINIC | Age: 50
End: 2025-05-22
Payer: COMMERCIAL